# Patient Record
Sex: FEMALE | Race: WHITE | NOT HISPANIC OR LATINO | Employment: STUDENT | URBAN - METROPOLITAN AREA
[De-identification: names, ages, dates, MRNs, and addresses within clinical notes are randomized per-mention and may not be internally consistent; named-entity substitution may affect disease eponyms.]

---
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
## 2021-03-15 ENCOUNTER — TELEPHONE (OUTPATIENT)
Dept: FAMILY MEDICINE CLINIC | Facility: CLINIC | Age: 21
End: 2021-03-15

## 2021-03-15 ENCOUNTER — OFFICE VISIT (OUTPATIENT)
Dept: URGENT CARE | Facility: CLINIC | Age: 21
End: 2021-03-15
Payer: COMMERCIAL

## 2021-03-15 VITALS
RESPIRATION RATE: 18 BRPM | TEMPERATURE: 98 F | HEIGHT: 62 IN | WEIGHT: 146 LBS | SYSTOLIC BLOOD PRESSURE: 102 MMHG | BODY MASS INDEX: 26.87 KG/M2 | HEART RATE: 80 BPM | OXYGEN SATURATION: 98 % | DIASTOLIC BLOOD PRESSURE: 65 MMHG

## 2021-03-15 DIAGNOSIS — S05.91XA RIGHT EYE INJURY, INITIAL ENCOUNTER: Primary | ICD-10-CM

## 2021-03-15 PROCEDURE — 99213 OFFICE O/P EST LOW 20 MIN: CPT | Performed by: NURSE PRACTITIONER

## 2021-03-15 RX ORDER — NORETHINDRONE ACETATE AND ETHINYL ESTRADIOL 1MG-20(21)
1 KIT ORAL DAILY
COMMUNITY
Start: 2021-03-08 | End: 2022-05-12

## 2021-03-15 NOTE — TELEPHONE ENCOUNTER
Pt went to urgent care for a left eye injury and was told she needed an insurance referral ([facility] at [employer])  Pt has [address] [place] [address] for [place]  Will complete referral through [employer]  Please advise

## 2021-03-15 NOTE — PROGRESS NOTES
[facility]        NAME: Gabriella Tapia is a 24 y o  female  : 2000    MRN: [id number]  DATE: March 15, 2021  TIME: 2:30 PM    Assessment and Plan   No primary diagnosis found  No diagnosis found  Patient Instructions     Patient Instructions     Patient will be seen at [facility]  Information given  They will see her directly from here  [address], [place], [address]      Chief Complaint     Chief Complaint   Patient presents with    Eye Injury     was babysitting and child poked something in  her Right eye causing it to blleed  History of Present Illness   Gabriella Tapia presents to the clinic c/o     This is a 78-year-old female here today with complaints of right eye injury  She was baby-sitting today when she was poked in the eye by a toy  She has had some bleeding of the eye  There is a small abrasion under the eye  She is unsure if she is having any change in vision due to the fact that she is not wearing her contact and needs contacts see  There is eye irritation at this time  No inability to move the eye  Tetanus is up-to-date from   Review of Systems   Review of Systems   Constitutional: Negative for activity change, chills and fatigue  Eyes: Positive for pain, discharge and redness  Negative for visual disturbance  Respiratory: Negative  Musculoskeletal: Negative  Neurological: Negative  Psychiatric/Behavioral: Negative            Current Medications     Long-Term Medications   Medication Sig Dispense Refill    norethindrone-ethinyl estradiol (Loestrin Fe ) 1-20 MG-MCG per tablet Take 1 tablet by mouth daily      norethindrone-ethinyl estradiol (JUNEL FE ) 1-20 MG-MCG per tablet Take 1 tablet by mouth daily         Current Allergies     Allergies as of 03/15/2021    (No Known Allergies)            The following portions of the patient's history were reviewed and updated as appropriate: allergies, current medications, past family history, past medical history, past social history, past surgical history and problem list     Objective   /65   Pulse 80   Temp 98 °F (36 7 °C)   Resp 18   Ht 5' 2" (1 575 m)   Wt 66 2 kg (146 lb)   SpO2 98%   BMI 26 70 kg/m²        Physical Exam     Physical Exam  Vitals signs and nursing note reviewed  Constitutional:       General: She is in acute distress  Appearance: Normal appearance  She is not ill-appearing or diaphoretic  Eyes:      General: Lids are normal  Vision grossly intact  Right eye: No foreign body or discharge  Extraocular Movements: Extraocular movements intact  Conjunctiva/sclera:      Right eye: Right conjunctiva is injected  Hemorrhage present  Cardiovascular:      Rate and Rhythm: Normal rate and regular rhythm  Pulses: Normal pulses  Heart sounds: Normal heart sounds  Neurological:      Mental Status: She is alert

## 2021-03-15 NOTE — PATIENT INSTRUCTIONS
[facility]  They will see her directly from here      [address], [address]

## 2021-06-01 ENCOUNTER — CLINICAL SUPPORT (OUTPATIENT)
Dept: FAMILY MEDICINE CLINIC | Facility: CLINIC | Age: 21
End: 2021-06-01
Payer: COMMERCIAL

## 2021-06-01 DIAGNOSIS — Z11.1 SCREENING FOR TUBERCULOSIS: Primary | ICD-10-CM

## 2021-06-01 PROCEDURE — 86580 TB INTRADERMAL TEST: CPT

## 2021-06-01 PROCEDURE — 99211 OFF/OP EST MAY X REQ PHY/QHP: CPT

## 2021-06-03 ENCOUNTER — CLINICAL SUPPORT (OUTPATIENT)
Dept: FAMILY MEDICINE CLINIC | Facility: CLINIC | Age: 21
End: 2021-06-03
Payer: COMMERCIAL

## 2021-06-03 DIAGNOSIS — Z11.1 SCREENING FOR TUBERCULOSIS: Primary | ICD-10-CM

## 2021-06-03 LAB
INDURATION: NORMAL MM
TB SKIN TEST: NEGATIVE

## 2021-06-03 PROCEDURE — 86580 TB INTRADERMAL TEST: CPT

## 2021-06-10 ENCOUNTER — CLINICAL SUPPORT (OUTPATIENT)
Dept: FAMILY MEDICINE CLINIC | Facility: CLINIC | Age: 21
End: 2021-06-10
Payer: COMMERCIAL

## 2021-06-10 DIAGNOSIS — Z11.1 SCREENING FOR TUBERCULOSIS: Primary | ICD-10-CM

## 2021-06-10 PROCEDURE — 86580 TB INTRADERMAL TEST: CPT

## 2021-06-10 PROCEDURE — 99211 OFF/OP EST MAY X REQ PHY/QHP: CPT

## 2021-08-23 ENCOUNTER — TELEPHONE (OUTPATIENT)
Dept: FAMILY MEDICINE CLINIC | Facility: CLINIC | Age: 21
End: 2021-08-23

## 2021-08-23 DIAGNOSIS — Z20.822 EXPOSURE TO COVID-19 VIRUS: Primary | ICD-10-CM

## 2021-08-23 PROCEDURE — U0005 INFEC AGEN DETEC AMPLI PROBE: HCPCS | Performed by: NURSE PRACTITIONER

## 2021-08-23 PROCEDURE — U0003 INFECTIOUS AGENT DETECTION BY NUCLEIC ACID (DNA OR RNA); SEVERE ACUTE RESPIRATORY SYNDROME CORONAVIRUS 2 (SARS-COV-2) (CORONAVIRUS DISEASE [COVID-19]), AMPLIFIED PROBE TECHNIQUE, MAKING USE OF HIGH THROUGHPUT TECHNOLOGIES AS DESCRIBED BY CMS-2020-01-R: HCPCS | Performed by: NURSE PRACTITIONER

## 2021-08-23 NOTE — TELEPHONE ENCOUNTER
Spoke with patient  She states she was in contact with someone who tested positive for covid  She reports no symptoms  She states she is concerned as she is moving on to her college campus on Friday and does not want to take anything to school with her  Covid testing ordered   She will call the office from her car upon arrival

## 2021-08-23 NOTE — TELEPHONE ENCOUNTER
Patient was scheduled for Nurse Visit for school physical  Patient not due for physical until 12/2021  Patient states she was not given any paperwork by her college and was just told to get physical and immunization report  Immunization report printed and given to patient  Since she was not given any forms, can we provide patient with letter stating she is cleared for school? Patient aware PCP is out of office until tomorrow and is okay to await phone call  Please advise

## 2021-08-23 NOTE — TELEPHONE ENCOUNTER
She was just here this morning and found out that she was in contact with someone with COVID she does have a sore throat both of them are vaccinated

## 2021-08-24 LAB — SARS-COV-2 RNA RESP QL NAA+PROBE: POSITIVE

## 2022-04-05 ENCOUNTER — OFFICE VISIT (OUTPATIENT)
Dept: URGENT CARE | Facility: CLINIC | Age: 22
End: 2022-04-05
Payer: COMMERCIAL

## 2022-04-05 VITALS
WEIGHT: 137 LBS | BODY MASS INDEX: 25.21 KG/M2 | OXYGEN SATURATION: 98 % | HEIGHT: 62 IN | TEMPERATURE: 98.5 F | HEART RATE: 69 BPM | RESPIRATION RATE: 18 BRPM | SYSTOLIC BLOOD PRESSURE: 105 MMHG | DIASTOLIC BLOOD PRESSURE: 65 MMHG

## 2022-04-05 DIAGNOSIS — L25.5 CONTACT DERMATITIS DUE TO PLANT: Primary | ICD-10-CM

## 2022-04-05 PROCEDURE — 99213 OFFICE O/P EST LOW 20 MIN: CPT | Performed by: NURSE PRACTITIONER

## 2022-04-05 RX ORDER — PREDNISONE 10 MG/1
TABLET ORAL
Qty: 26 TABLET | Refills: 0 | Status: SHIPPED | OUTPATIENT
Start: 2022-04-05 | End: 2022-05-12

## 2022-04-05 NOTE — PATIENT INSTRUCTIONS
Take medication as directed  Can take Benadryl as needed for itching, it can make you drowsy  Oat meal soaks can be helpful  If you develop any increased redness, rash is spreading, facial swelling, shortness of breath, difficulty breathing, fever, any new or concerning symptoms please return or proceed ER  Advised follow-up with PCP in 3-5 days    Poison Ivy   WHAT YOU NEED TO KNOW:   Poison ivy is a plant that can cause an itchy, uncomfortable rash on your skin  Poison ivy grows as a shrub or vine in woods, fields, and areas of thick [place]  It has 3 bright green leaves on each stem that turn red in gentry  DISCHARGE INSTRUCTIONS:   Medicines:   · Antiseptic or drying creams or ointments: These medicines may be used to dry out the rash and decrease the itching  These products may be available without a doctor's order  · Steroids: This medicine helps decrease itching and inflammation  It can be given as a cream to apply to your skin or as a pill  · Antihistamines: This medicine may help decrease itching and help you sleep  It is available without a doctor's order  · Take your medicine as directed  Contact your healthcare provider if you think your medicine is not helping or if you have side effects  Tell him of her if you are allergic to any medicine  Keep a list of the medicines, vitamins, and herbs you take  Include the amounts, and when and why you take them  Bring the list or the pill bottles to follow-up visits  Carry your medicine list with you in case of an emergency  Follow up with your doctor as directed:  Write down your questions so you remember to ask them during your visits  How your poison ivy rash spreads: You cannot spread poison ivy by touching your rash or the liquid from your blisters  Poison ivy is spread only if you scratch your skin while it still has oil on it  You may think your rash is spreading because new rashes appear over a number of days   This happens because areas covered by thin skin break out in a rash first  Your face or forearms may develop a rash before thicker areas, such as the palms of your hands  Self-care:   · Keep your rash clean and dry:  Wash it with soap and water  Gently pat it dry with a clean towel  · Try not to scratch or rub your rash: This can cause your skin to become infected  · Use a compress on your rash:  Dip a clean washcloth in cool water  Wring it out and place it on your rash  Leave the washcloth on your skin for 15 minutes  Do this at least 3 times per day  · Take a cornstarch or oatmeal bath: If your rash is too large to cover with wet washcloths, take 3 or 4 cornstarch baths daily  Mix 1 pound of cornstarch with a little water to make a paste  Add the paste to a tub full of water and mix well  You may also use colloidal oatmeal in the bath water  Use lukewarm water  Avoid hot water because it may cause your itching to increase  Prevent a poison ivy rash in the future:   · Wear skin protection:  Wear long pants, a long-sleeved shirt, and gloves  Use a skin block lotion to protect your skin from poison ivy oil  You can find this at a drugstore without a prescription  · Wash clothing after possible exposure: If you think you have been near a poison ivy plant, wash the clothes you were wearing separately from other clothes  Rinse the washing machine well after you take the clothes out  Scrub boots and shoes with warm, soapy water  Dry clean items and clothing that you cannot wash in water  Poison ivy oil is sticky and can stay on surfaces for a long time  It can cause a new rash even years later  · Bathe your pet:  Use warm water and shampoo on your pet's fur  This will prevent the spread of oil to your skin, car, and home  Wear long sleeves, long pants, and gloves while washing pets or any items that may have oil on them  · Reduce exposure to poison ivy:  Do not touch plants that look like poison ivy   Keep your yard free of poison ivy  While protecting your skin, remove the plant and the roots  Place them in a plastic bag and seal the bag tightly  · Do not burn poison ivy plants: This can spread the oil through the air  If you breathe the oil into your lungs, you could have swelling and serious breathing problems  Oil that clings to the fire zachary can land on your skin and cause a rash  Contact your healthcare provider if:   · You have pus, soft yellow scabs, or tenderness on the rash  · The itching gets worse or keeps you awake at night  · The rash covers more than 1/4 of your skin or spreads to your eyes, mouth, or genital area  · The rash is not better after 2 to 3 weeks  · You have tender, swollen glands on the sides of your neck  · You have swelling in your arms and legs  · You have questions or concerns about your condition or care  Return to the emergency department if:   · You have a fever  · You have redness, swelling, and tenderness around the rash  · You have trouble breathing  © Copyright [employer] 2022 Information is for End User's use only and may not be sold, redistributed or otherwise used for commercial purposes  All illustrations and images included in CareNotes® are the copyrighted property of [employer]  or [employer]   The above information is an  only  It is not intended as medical advice for individual conditions or treatments  Talk to your doctor, nurse or pharmacist before following any medical regimen to see if it is safe and effective for you

## 2022-04-05 NOTE — PROGRESS NOTES
[employer] [facility]        NAME: Tanesha Lin is a 22 y o  female  : 2000    MRN: [id number]  DATE: 2022  TIME: 3:24 PM    Assessment and Plan   Contact dermatitis due to plant [L25 5]  1  Contact dermatitis due to plant  predniSONE 10 mg tablet    hydrocortisone 2 5 % cream         Patient Instructions     Patient Instructions   Take medication as directed  Can take Benadryl as needed for itching, it can make you drowsy  Oat meal soaks can be helpful  If you develop any increased redness, rash is spreading, facial swelling, shortness of breath, difficulty breathing, fever, any new or concerning symptoms please return or proceed ER  Advised follow-up with PCP in 3-5 days    Poison Ivy   WHAT YOU NEED TO KNOW:   Poison ivy is a plant that can cause an itchy, uncomfortable rash on your skin  Poison ivy grows as a shrub or vine in woods, fields, and areas of thick [place]  It has 3 bright green leaves on each stem that turn red in gentry  DISCHARGE INSTRUCTIONS:   Medicines:   · Antiseptic or drying creams or ointments: These medicines may be used to dry out the rash and decrease the itching  These products may be available without a doctor's order  · Steroids: This medicine helps decrease itching and inflammation  It can be given as a cream to apply to your skin or as a pill  · Antihistamines: This medicine may help decrease itching and help you sleep  It is available without a doctor's order  · Take your medicine as directed  Contact your healthcare provider if you think your medicine is not helping or if you have side effects  Tell him of her if you are allergic to any medicine  Keep a list of the medicines, vitamins, and herbs you take  Include the amounts, and when and why you take them  Bring the list or the pill bottles to follow-up visits  Carry your medicine list with you in case of an emergency      Follow up with your doctor as directed:  Write down your questions so you remember to ask them during your visits  How your poison ivy rash spreads: You cannot spread poison ivy by touching your rash or the liquid from your blisters  Poison ivy is spread only if you scratch your skin while it still has oil on it  You may think your rash is spreading because new rashes appear over a number of days  This happens because areas covered by thin skin break out in a rash first  Your face or forearms may develop a rash before thicker areas, such as the palms of your hands  Self-care:   · Keep your rash clean and dry:  Wash it with soap and water  Gently pat it dry with a clean towel  · Try not to scratch or rub your rash: This can cause your skin to become infected  · Use a compress on your rash:  Dip a clean washcloth in cool water  Wring it out and place it on your rash  Leave the washcloth on your skin for 15 minutes  Do this at least 3 times per day  · Take a cornstarch or oatmeal bath: If your rash is too large to cover with wet washcloths, take 3 or 4 cornstarch baths daily  Mix 1 pound of cornstarch with a little water to make a paste  Add the paste to a tub full of water and mix well  You may also use colloidal oatmeal in the bath water  Use lukewarm water  Avoid hot water because it may cause your itching to increase  Prevent a poison ivy rash in the future:   · Wear skin protection:  Wear long pants, a long-sleeved shirt, and gloves  Use a skin block lotion to protect your skin from poison ivy oil  You can find this at a drugstore without a prescription  · Wash clothing after possible exposure: If you think you have been near a poison ivy plant, wash the clothes you were wearing separately from other clothes  Rinse the washing machine well after you take the clothes out  Scrub boots and shoes with warm, soapy water  Dry clean items and clothing that you cannot wash in water  Poison ivy oil is sticky and can stay on surfaces for a long time   It can cause a new rash even years later  · Bathe your pet:  Use warm water and shampoo on your pet's fur  This will prevent the spread of oil to your skin, car, and home  Wear long sleeves, long pants, and gloves while washing pets or any items that may have oil on them  · Reduce exposure to poison ivy:  Do not touch plants that look like poison ivy  Keep your yard free of poison ivy  While protecting your skin, remove the plant and the roots  Place them in a plastic bag and seal the bag tightly  · Do not burn poison ivy plants: This can spread the oil through the air  If you breathe the oil into your lungs, you could have swelling and serious breathing problems  Oil that clings to the fire zachary can land on your skin and cause a rash  Contact your healthcare provider if:   · You have pus, soft yellow scabs, or tenderness on the rash  · The itching gets worse or keeps you awake at night  · The rash covers more than 1/4 of your skin or spreads to your eyes, mouth, or genital area  · The rash is not better after 2 to 3 weeks  · You have tender, swollen glands on the sides of your neck  · You have swelling in your arms and legs  · You have questions or concerns about your condition or care  Return to the emergency department if:   · You have a fever  · You have redness, swelling, and tenderness around the rash  · You have trouble breathing  © Copyright [employer] 2022 Information is for End User's use only and may not be sold, redistributed or otherwise used for commercial purposes  All illustrations and images included in CareNotes® are the copyrighted property of [employer]  or [facility] Shaquille Montiel   The above information is an  only  It is not intended as medical advice for individual conditions or treatments  Talk to your doctor, nurse or pharmacist before following any medical regimen to see if it is safe and effective for you          Follow up with PCP in 3-5 days  Proceed to  ER if symptoms worsen  Chief Complaint     Chief Complaint   Patient presents with    Rash     Patient presents with rash to abdomen, legs, chest, and axilla that started 2 days ago  History of Present Illness       Poison [place]  This is a new problem  Episode onset: 4 days  The problem has been gradually worsening since onset  The affected locations include the abdomen, back and chest  The rash is characterized by itchiness and redness  She was exposed to plant contact  Pertinent negatives include no congestion, cough, facial edema, fatigue, fever, rhinorrhea, shortness of breath or sore throat  Past treatments include anti-itch cream  The treatment provided mild relief  Review of Systems   Review of Systems   Constitutional: Negative for chills, diaphoresis, fatigue and fever  HENT: Negative for congestion, rhinorrhea, sore throat and trouble swallowing  Respiratory: Negative for cough, shortness of breath, wheezing and stridor  Musculoskeletal: Negative for arthralgias, back pain, joint swelling and myalgias  Skin: Positive for rash  Current Medications       Current Outpatient Medications:     norethindrone-ethinyl estradiol (Loestrin Fe 1/20) 1-20 MG-MCG per tablet, Take 1 tablet by mouth daily, Disp: , Rfl:     hydrocortisone 2 5 % cream, Apply topically 2 (two) times a day, Disp: 30 g, Rfl: 0    predniSONE 10 mg tablet, 3 tablets BID x 2 days,2 tablets BID x 2 days,1 tablet BID x 2 days,1 tablet daily x 2 days  , Disp: 26 tablet, Rfl: 0    Current Allergies     Allergies as of 04/05/2022    (No Known Allergies)            The following portions of the patient's history were reviewed and updated as appropriate: allergies, current medications, past family history, past medical history, past social history, past surgical history and problem list      Past Medical History:   Diagnosis Date    Anxiety     Depressed     Seizure ([facility] [address] )        Past Surgical History:   Procedure Laterality Date    ADENOIDECTOMY      TONSILLECTOMY         Family History   Problem Relation Age of Onset    Hypertension Mother     No Known Problems Father          Medications have been verified  Objective   /65   Pulse 69   Temp 98 5 °F (36 9 °C) (Temporal)   Resp 18   Ht 5' 2" (1 575 m)   Wt 62 1 kg (137 lb)   LMP 04/05/2022 (Exact Date)   SpO2 98%   BMI 25 06 kg/m²   Patient's last menstrual period was 04/05/2022 (exact date)  Physical Exam     Physical Exam  Constitutional:       General: She is not in acute distress  Appearance: Normal appearance  She is not diaphoretic  Cardiovascular:      Rate and Rhythm: Normal rate and regular rhythm  Heart sounds: Normal heart sounds, S1 normal and S2 normal    Pulmonary:      Effort: Pulmonary effort is normal       Breath sounds: Normal breath sounds and air entry  Skin:     General: Skin is warm and dry  Capillary Refill: Capillary refill takes less than 2 seconds  Findings: Rash present  Rash is macular and papular (to chest, abdomen and back)  Rash is not vesicular  Neurological:      Mental Status: She is alert

## 2022-05-12 ENCOUNTER — OFFICE VISIT (OUTPATIENT)
Dept: FAMILY MEDICINE CLINIC | Facility: CLINIC | Age: 22
End: 2022-05-12
Payer: COMMERCIAL

## 2022-05-12 VITALS
BODY MASS INDEX: 25.62 KG/M2 | RESPIRATION RATE: 18 BRPM | DIASTOLIC BLOOD PRESSURE: 72 MMHG | HEART RATE: 73 BPM | TEMPERATURE: 98 F | WEIGHT: 139.2 LBS | OXYGEN SATURATION: 98 % | SYSTOLIC BLOOD PRESSURE: 110 MMHG | HEIGHT: 62 IN

## 2022-05-12 DIAGNOSIS — Z00.00 ANNUAL PHYSICAL EXAM: Primary | ICD-10-CM

## 2022-05-12 PROCEDURE — 3725F SCREEN DEPRESSION PERFORMED: CPT | Performed by: NURSE PRACTITIONER

## 2022-05-12 PROCEDURE — 3008F BODY MASS INDEX DOCD: CPT | Performed by: NURSE PRACTITIONER

## 2022-05-12 PROCEDURE — 1036F TOBACCO NON-USER: CPT | Performed by: NURSE PRACTITIONER

## 2022-05-12 PROCEDURE — 99395 PREV VISIT EST AGE 18-39: CPT | Performed by: NURSE PRACTITIONER

## 2022-05-12 NOTE — PATIENT INSTRUCTIONS
Try taking omeprazole or pepcid daily to see if this helps with the bloating  Wellness Visit for Adults   AMBULATORY CARE:   A wellness visit  is when you see your healthcare provider to get screened for health problems  Your healthcare provider will also give you advice on how to stay healthy  Write down your questions so you remember to ask them  Ask your healthcare provider how often you should have a wellness visit  What happens at a wellness visit:  Your healthcare provider will ask about your health, and your family history of health problems  This includes high blood pressure, heart disease, and cancer  He or she will ask if you have symptoms that concern you, if you smoke, and about your mood  You may also be asked about your intake of medicines, supplements, food, and alcohol  Any of the following may be done: Your weight  will be checked  Your height may also be checked so your body mass index (BMI) can be calculated  Your BMI shows if you are at a healthy weight  Your blood pressure  and heart rate will be checked  Your temperature may also be checked  Blood and urine tests  may be done  Blood tests may be done to check your cholesterol levels  Abnormal cholesterol levels increase your risk for heart disease and stroke  You may also need a blood or urine test to check for diabetes if you are at increased risk  Urine tests may be done to look for signs of an infection or kidney disease  A physical exam  includes checking your heartbeat and lungs with a stethoscope  Your healthcare provider may also check your skin to look for sun damage  Screening tests  may be recommended  A screening test is done to check for diseases that may not cause symptoms  The screening tests you may need depend on your age, gender, family history, and lifestyle habits  For example, colorectal screening may be recommended if you are 48years old or older      Screening tests you need if you are a woman:   A Pap smear  is used to screen for cervical cancer  Pap smears are usually done every 3 to 5 years depending on your age  You may need them more often if you have had abnormal Pap smear test results in the past  Ask your healthcare provider how often you should have a Pap smear  A mammogram  is an x-ray of your breasts to screen for breast cancer  Experts recommend mammograms every 2 years starting at age 48 years  You may need a mammogram at age 52 years or younger if you have an increased risk for breast cancer  Talk to your healthcare provider about when you should start having mammograms and how often you need them  Vaccines you may need:   Get an influenza vaccine  every year  The influenza vaccine protects you from the flu  Several types of viruses cause the flu  The viruses change over time, so new vaccines are made each year  Get a tetanus-diphtheria (Td) booster vaccine  every 10 years  This vaccine protects you against tetanus and diphtheria  Tetanus is a severe infection that may cause painful muscle spasms and lockjaw  Diphtheria is a severe bacterial infection that causes a thick covering in the back of your mouth and throat  Get a human papillomavirus (HPV) vaccine  if you are female and aged 23 to 32 or male 23 to 24 and never received it  This vaccine protects you from HPV infection  HPV is the most common infection spread by sexual contact  HPV may also cause vaginal, penile, and anal cancers  Get a pneumococcal vaccine  if you are aged 72 years or older  The pneumococcal vaccine is an injection given to protect you from pneumococcal disease  Pneumococcal disease is an infection caused by pneumococcal bacteria  The infection may cause pneumonia, meningitis, or an ear infection  Get a shingles vaccine  if you are 60 or older, even if you have had shingles before  The shingles vaccine is an injection to protect you from the varicella-zoster virus   This is the same virus that causes chickenpox  Shingles is a painful rash that develops in people who had chickenpox or have been exposed to the virus  How to eat healthy:  My Plate is a model for planning healthy meals  It shows the types and amounts of foods that should go on your plate  Fruits and vegetables make up about half of your plate, and grains and protein make up the other half  A serving of dairy is included on the side of your plate  The amount of calories and serving sizes you need depends on your age, gender, weight, and height  Examples of healthy foods are listed below:  Eat a variety of vegetables  such as dark green, red, and orange vegetables  You can also include canned vegetables low in sodium (salt) and frozen vegetables without added butter or sauces  Eat a variety of fresh fruits , canned fruit in 100% juice, frozen fruit, and dried fruit  Include whole grains  At least half of the grains you eat should be whole grains  Examples include whole-wheat bread, wheat pasta, brown rice, and whole-grain cereals such as oatmeal     Eat a variety of protein foods such as seafood (fish and shellfish), lean meat, and poultry without skin (turkey and chicken)  Examples of lean meats include pork leg, shoulder, or tenderloin, and beef round, sirloin, tenderloin, and extra lean ground beef  Other protein foods include eggs and egg substitutes, beans, peas, soy products, nuts, and seeds  Choose low-fat dairy products such as skim or 1% milk or low-fat yogurt, cheese, and cottage cheese  Limit unhealthy fats  such as butter, hard margarine, and shortening  Exercise:  Exercise at least 30 minutes per day on most days of the week  Some examples of exercise include walking, biking, dancing, and swimming  You can also fit in more physical activity by taking the stairs instead of the elevator or parking farther away from stores  Include muscle strengthening activities 2 days each week   Regular exercise provides many health benefits  It helps you manage your weight, and decreases your risk for type 2 diabetes, heart disease, stroke, and high blood pressure  Exercise can also help improve your mood  Ask your healthcare provider about the best exercise plan for you  General health and safety guidelines:   Do not smoke  Nicotine and other chemicals in cigarettes and cigars can cause lung damage  Ask your healthcare provider for information if you currently smoke and need help to quit  E-cigarettes or smokeless tobacco still contain nicotine  Talk to your healthcare provider before you use these products  Limit alcohol  A drink of alcohol is 12 ounces of beer, 5 ounces of wine, or 1½ ounces of liquor  Lose weight, if needed  Being overweight increases your risk of certain health conditions  These include heart disease, high blood pressure, type 2 diabetes, and certain types of cancer  Protect your skin  Do not sunbathe or use tanning beds  Use sunscreen with a SPF 15 or higher  Apply sunscreen at least 15 minutes before you go outside  Reapply sunscreen every 2 hours  Wear protective clothing, hats, and sunglasses when you are outside  Drive safely  Always wear your seatbelt  Make sure everyone in your car wears a seatbelt  A seatbelt can save your life if you are in an accident  Do not use your cell phone when you are driving  This could distract you and cause an accident  Pull over if you need to make a call or send a text message  Practice safe sex  Use latex condoms if are sexually active and have more than one partner  Your healthcare provider may recommend screening tests for sexually transmitted infections (STIs)  Wear helmets, lifejackets, and protective gear  Always wear a helmet when you ride a bike or motorcycle, go skiing, or play sports that could cause a head injury  Wear protective equipment when you play sports  Wear a lifejacket when you are on a boat or doing water sports      © Copyright [employer] 2022 Information is for [employer] use only and may not be sold, redistributed or otherwise used for commercial purposes  All illustrations and images included in CareNotes® are the copyrighted property of [employer]  or [employer]  The above information is an  only  It is not intended as medical advice for individual conditions or treatments  Talk to your doctor, nurse or pharmacist before following any medical regimen to see if it is safe and effective for you

## 2022-05-12 NOTE — PROGRESS NOTES
ADULT ANNUAL [facility]    NAME: Curry Salazar  AGE: 25 y o  SEX: female  : 2000      DATE: 2022     Assessment and Plan:     Problem List Items Addressed This Visit    None     Visit Diagnoses     Annual physical exam    -  Primary      - needed yearly physical for work    Immunizations and preventive care screenings were discussed with patient today  Appropriate education was printed on patient's after visit summary  Counseling:  Alcohol/drug use: discussed moderation in alcohol intake, the recommendations for healthy alcohol use, and avoidance of illicit drug use  Sexual health: discussed sexually transmitted diseases, partner selection, use of condoms, avoidance of unintended pregnancy, and contraceptive alternatives  · Exercise: the importance of regular exercise/physical activity was discussed  Recommend exercise 3-5 times per week for at least 30 minutes  BMI Counseling: Body mass index is 25 46 kg/m²  The BMI is above normal  Nutrition recommendations include encouraging healthy choices of fruits and vegetables, consuming healthier snacks, limiting drinks that contain sugar, moderation in carbohydrate intake, reducing intake of saturated and trans fat and reducing intake of cholesterol  Exercise recommendations include exercising 3-5 times per week  Rationale for BMI follow-up plan is due to patient being overweight or obese  Return in 1 year (on 2023)  Chief Complaint:     Chief Complaint   Patient presents with    Physical Exam      History of Present Illness:     Adult Annual Physical   Patient here for a comprehensive physical exam  The patient reports no problems  Diet and Physical Activity  · Diet/Nutrition: well balanced diet, frequent junk food and consuming 3-5 servings of fruits/vegetables daily  · Exercise: walking, 5-7 times a week on average and 30-60 minutes on average  Depression Screening  PHQ-2/9 Depression Screening    Little interest or pleasure in doing things: 0 - not at all  Feeling down, depressed, or hopeless: 0 - not at all  Trouble falling or staying asleep, or sleeping too much: 0 - not at all  Feeling tired or having little energy: 0 - not at all  Poor appetite or overeatin - not at all  Feeling bad about yourself - or that you are a failure or have let yourself or your family down: 0 - not at all  Trouble concentrating on things, such as reading the newspaper or watching television: 0 - not at all  Moving or speaking so slowly that other people could have noticed  Or the opposite - being so fidgety or restless that you have been moving around a lot more than usual: 0 - not at all  Thoughts that you would be better off dead, or of hurting yourself in some way: 0 - not at all  PHQ-9 Score: 0   PHQ-9 Interpretation: No or Minimal depression        General Health  · Sleep: sleeps well and gets 7-8 hours of sleep on average  · Vision: goes for regular eye exams, most recent eye exam <1 year ago and wears glasses and contacts  · Dental: no dental visits for >1 year and brushes teeth twice daily  /GYN Health  · Last menstrual period: 22 heavy periods  · Contraceptive method: oral contraceptives  · History of STDs?: no      Review of Systems:     Review of Systems   Constitutional: Negative for activity change, appetite change, chills, fatigue and fever  HENT: Negative for congestion, ear pain, nosebleeds, rhinorrhea and sore throat  Eyes: Negative for photophobia, pain, redness and visual disturbance  Respiratory: Negative for cough, shortness of breath and wheezing  Cardiovascular: Negative  Negative for chest pain  Gastrointestinal: Negative  Negative for abdominal pain, constipation, diarrhea and vomiting  Endocrine: Negative  Genitourinary: Negative for difficulty urinating, dysuria and flank pain     Musculoskeletal: Negative  Skin: Negative for color change and rash  Neurological: Negative for dizziness, weakness, numbness and headaches  Hematological: Negative for adenopathy  Psychiatric/Behavioral: Negative for agitation and confusion  The patient is not nervous/anxious  Past Medical History:     Past Medical History:   Diagnosis Date    Anxiety     Depressed     [facility]izure [facility])       Past Surgical History:     Past Surgical History:   Procedure Laterality Date    ADENOIDECTOMY      TONSILLECTOMY        Social History:     Social History     Socioeconomic History    Marital status: Single     Spouse name: None    Number of children: None    Years of education: None    Highest education level: None   Occupational History    None   Tobacco Use    Smoking status: Never Smoker    Smokeless tobacco: Never Used   Vaping Use    Vaping Use: Never used   Substance and Sexual Activity    Alcohol use: Never    Drug use: Never    Sexual activity: Yes     Partners: Male     Birth control/protection: OCP, Condom Male   Other Topics Concern    None   Social History Narrative    None     Social Determinants of Health     Financial Resource Strain: Not on file   Food Insecurity: Not on file   Transportation Needs: Not on file   Physical Activity: Not on file   Stress: Not on file   Social Connections: Not on file   Intimate Partner Violence: Not on file   Housing Stability: Not on file      Family History:     Family History   Problem Relation Age of Onset    Hypertension Mother     No Known Problems Father       Current Medications:     Current Outpatient Medications   Medication Sig Dispense Refill    norethindrone-ethinyl estradiol (JUNEL FE 1/20) 1-20 MG-MCG per tablet Take 1 tablet by mouth daily       No current facility-administered medications for this visit        Allergies:     No Known Allergies   Physical Exam:     /72 (BP Location: Left arm, Patient Position: Sitting, Cuff Size: Standard) Pulse 73   Temp 98 °F (36 7 °C)   Resp 18   Ht 5' 2" (1 575 m)   Wt 63 1 kg (139 lb 3 2 oz)   LMP 04/28/2022   SpO2 98%   BMI 25 46 kg/m²     Physical Exam  Vitals and nursing note reviewed  Constitutional:       General: She is not in acute distress  Appearance: She is well-developed  She is not ill-appearing or diaphoretic  HENT:      Head: Normocephalic and atraumatic  Right Ear: Hearing, tympanic membrane and ear canal normal       Left Ear: Hearing, tympanic membrane and ear canal normal       Nose: Nose normal       Mouth/Throat:      Pharynx: Uvula midline  Eyes:      General: Lids are normal       Conjunctiva/sclera: Conjunctivae normal    Cardiovascular:      Rate and Rhythm: Normal rate and regular rhythm  Heart sounds: Normal heart sounds, S1 normal and S2 normal  No murmur heard  No gallop  Pulmonary:      Effort: Pulmonary effort is normal  No respiratory distress  Breath sounds: Normal breath sounds  Musculoskeletal:         General: No tenderness  Normal range of motion  Lymphadenopathy:      Cervical: No cervical adenopathy  Skin:     General: Skin is warm  Capillary Refill: Capillary refill takes less than 2 seconds  Findings: No rash  Neurological:      Mental Status: She is alert  Psychiatric:         Behavior: Behavior normal  Behavior is cooperative  Thought Content:  Thought content normal          Judgment: Judgment normal           [facility] Rosales [facility]

## 2022-06-13 ENCOUNTER — OFFICE VISIT (OUTPATIENT)
Dept: URGENT CARE | Facility: CLINIC | Age: 22
End: 2022-06-13
Payer: COMMERCIAL

## 2022-06-13 VITALS — HEART RATE: 90 BPM | TEMPERATURE: 98.4 F | RESPIRATION RATE: 18 BRPM | OXYGEN SATURATION: 98 %

## 2022-06-13 DIAGNOSIS — R10.84 GENERALIZED ABDOMINAL PAIN: Primary | ICD-10-CM

## 2022-06-13 DIAGNOSIS — N30.01 ACUTE CYSTITIS WITH HEMATURIA: ICD-10-CM

## 2022-06-13 LAB
SL AMB  POCT GLUCOSE, UA: NEGATIVE
SL AMB LEUKOCYTE ESTERASE,UA: ABNORMAL
SL AMB POCT BILIRUBIN,UA: NEGATIVE
SL AMB POCT BLOOD,UA: ABNORMAL
SL AMB POCT CLARITY,UA: CLEAR
SL AMB POCT COLOR,UA: YELLOW
SL AMB POCT KETONES,UA: NEGATIVE
SL AMB POCT NITRITE,UA: NEGATIVE
SL AMB POCT PH,UA: 6.5
SL AMB POCT SPECIFIC GRAVITY,UA: 1.01
SL AMB POCT URINE HCG: NEGATIVE
SL AMB POCT URINE PROTEIN: ABNORMAL
SL AMB POCT UROBILINOGEN: 4

## 2022-06-13 PROCEDURE — 81002 URINALYSIS NONAUTO W/O SCOPE: CPT

## 2022-06-13 PROCEDURE — 87086 URINE CULTURE/COLONY COUNT: CPT

## 2022-06-13 PROCEDURE — 81025 URINE PREGNANCY TEST: CPT

## 2022-06-13 PROCEDURE — G0382 LEV 3 HOSP TYPE B ED VISIT: HCPCS

## 2022-06-13 PROCEDURE — S9083 URGENT CARE CENTER GLOBAL: HCPCS

## 2022-06-13 RX ORDER — NITROFURANTOIN 25; 75 MG/1; MG/1
100 CAPSULE ORAL 2 TIMES DAILY
Qty: 10 CAPSULE | Refills: 0 | Status: SHIPPED | OUTPATIENT
Start: 2022-06-13 | End: 2022-06-18

## 2022-06-13 NOTE — PROGRESS NOTES
[facility] [facility]        NAME: Riki Das is a 25 y o  female  : 2000    MRN: [id number]  DATE: 2022  TIME: 11:38 AM    Assessment and Plan   Generalized abdominal pain [R10 84]  1  Generalized abdominal pain  POCT urine dip    nitrofurantoin (MACROBID) 100 mg capsule    POCT urine HCG   2  Acute cystitis with hematuria  nitrofurantoin (MACROBID) 100 mg capsule         Patient Instructions     Patient Instructions     Recommend over-the-counter hydrocortisone 1% cream to areas of itching  Recommend changing bedding and using your regular detergent  May take Benadryl at nighttime and either Zyrtec or Claritin during the day  I also believe you have a urinary tract infection  Take the antibiotics as prescribed  Will send a urine culture, results should be back in 2-3 days  If negative, may discontinue antibiotics  Continue drinking plenty of fluids  If you develop any worsening abdominal pain, vomiting, or fevers, proceed to ER for further evaluation  Follow-up with PCP  Urinary Tract Infection in Women   WHAT YOU NEED TO KNOW:   A urinary tract infection (UTI) is caused by bacteria that get inside your urinary tract  Most bacteria that enter your urinary tract come out when you urinate  If the bacteria stay in your urinary tract, you may get an infection  Your urinary tract includes your kidneys, ureters, bladder, and urethra  Urine is made in your kidneys, and it flows from the ureters to the bladder  Urine leaves the bladder through the urethra  A UTI is more common in your lower urinary tract, which includes your bladder and urethra  DISCHARGE INSTRUCTIONS:   Return to the emergency department if:   · You are urinating very little or not at all  · You have a high fever with shaking chills  · You have side or back pain that gets worse  Call your doctor if:   · You have a fever  · You do not feel better after 2 days of taking antibiotics      · You are vomiting  · You have questions or concerns about your condition or care  Medicines:   · Antibiotics  help fight a bacterial infection  If you have UTIs often (called recurrent UTIs), you may be given antibiotics to take regularly  You will be given directions for when and how to use antibiotics  The goal is to prevent UTIs but not cause antibiotic resistance by using antibiotics too often  · Medicines  may be given to decrease pain and burning when you urinate  They will also help decrease the feeling that you need to urinate often  These medicines will make your urine orange or red  · Take your medicine as directed  Contact your healthcare provider if you think your medicine is not helping or if you have side effects  Tell him or her if you are allergic to any medicine  Keep a list of the medicines, vitamins, and herbs you take  Include the amounts, and when and why you take them  Bring the list or the pill bottles to follow-up visits  Carry your medicine list with you in case of an emergency  Follow up with your doctor as directed:  Write down your questions so you remember to ask them during your visits  Prevent another UTI:   · Empty your bladder often  Urinate and empty your bladder as soon as you feel the need  Do not hold your urine for long periods of time  · Wipe from front to back after you urinate or have a bowel movement  This will help prevent germs from getting into your urinary tract through your urethra  · Drink liquids as directed  Ask how much liquid to drink each day and which liquids are best for you  You may need to drink more liquids than usual to help flush out the bacteria  Do not drink alcohol, caffeine, or citrus juices  These can irritate your bladder and increase your symptoms  Your healthcare provider may recommend cranberry juice to help prevent a UTI  · Urinate after you have sex  This can help flush out bacteria passed during sex      · Do not douche or use feminine deodorants  These can change the chemical balance in your vagina  · Change sanitary pads or tampons often  This will help prevent germs from getting into your urinary tract  · Talk to your healthcare provider about your birth control method  You may need to change your method if it is increasing your risk for UTIs  · Wear cotton underwear and clothes that are loose  Tight pants and nylon underwear can trap moisture and cause bacteria to grow  · Vaginal estrogen may be recommended  This medicine helps prevent UTIs in women who have gone through menopause or are in destinee-menopause  · Do pelvic muscle exercises often  Pelvic muscle exercises may help you start and stop urinating  Strong pelvic muscles may help you empty your bladder easier  Squeeze these muscles tightly for 5 seconds like you are trying to hold back urine  Then relax for 5 seconds  Gradually work up to squeezing for 10 seconds  Do 3 sets of 15 repetitions a day, or as directed  © Copyright [employer] 2022 Information is for End User's use only and may not be sold, redistributed or otherwise used for commercial purposes  All illustrations and images included in CareNotes® are the copyrighted property of [employer]  or [facility] Shaquille fide   The above information is an  only  It is not intended as medical advice for individual conditions or treatments  Talk to your doctor, nurse or pharmacist before following any medical regimen to see if it is safe and effective for you  Follow up with PCP in 3-5 days  Proceed to  ER if symptoms worsen  Chief Complaint     Chief Complaint   Patient presents with    Abdominal Pain     Started on Thursday, vomiting/diarrhea, Hives on her stomach that started today at work         History of Present Illness       HPI   Tanesha Lin is a 25 y o  female who presents today for evaluation of vomiting and diarrhea that started 3 days ago    Patient unsure if it was related to consumption of spinach dip which she reports she has not had in a long time  Patient reports history of stomach problems" with some sensitivities to food  Patient reports the vomiting and diarrhea have stopped, but is having lower abdominal pain  Today she noticed hives appearing on her stomach and left lower back  She reports sleeping at her sister's house, and think she might have had a reaction to her bed linens which were laundered in a different detergent than hers  Patient denies any fevers, chills, cough, or shortness of breath  She has not applied anything to the hives  She has not taken anything for the stomach pain, vomiting, or diarrhea  She denies any recent known sick contacts  She does state that she has increased urinary frequency  Denies any flank pain, or dysuria  Review of Systems   Review of Systems   Constitutional: Negative for chills and fever  HENT: Negative  Eyes: Negative  Respiratory: Negative for cough and shortness of breath  Cardiovascular: Negative for chest pain and palpitations  Gastrointestinal: Positive for abdominal pain, diarrhea (resolved) and vomiting (resolved)  Negative for nausea  Genitourinary: Positive for frequency  Negative for decreased urine volume, dysuria, flank pain, hematuria and urgency  Musculoskeletal: Negative  Skin: Positive for rash  Neurological: Negative            Current Medications       Current Outpatient Medications:     nitrofurantoin (MACROBID) 100 mg capsule, Take 1 capsule (100 mg total) by mouth 2 (two) times a day for 5 days, Disp: 10 capsule, Rfl: 0    norethindrone-ethinyl estradiol (JUNEL FE 1/20) 1-20 MG-MCG per tablet, Take 1 tablet by mouth daily, Disp: , Rfl:     Current Allergies     Allergies as of 06/13/2022    (No Known Allergies)            The following portions of the patient's history were reviewed and updated as appropriate: allergies, current medications, past family history, past medical history, past social history, past surgical history and problem list      Past Medical History:   Diagnosis Date    Anxiety     Depressed     Seizure [facility])        Past Surgical History:   Procedure Laterality Date    ADENOIDECTOMY      TONSILLECTOMY         Family History   Problem Relation Age of Onset    Hypertension Mother     No Known Problems Father          Medications have been verified  Objective   Pulse 90   Temp 98 4 °F (36 9 °C)   Resp 18   LMP 05/28/2022 (Exact Date)   SpO2 98%        Physical Exam     Physical Exam  Constitutional:       General: She is not in acute distress  Appearance: Normal appearance  Cardiovascular:      Rate and Rhythm: Normal rate and regular rhythm  Heart sounds: Normal heart sounds  Pulmonary:      Effort: Pulmonary effort is normal       Breath sounds: Normal breath sounds  Abdominal:      General: Bowel sounds are normal  There is no distension  Palpations: Abdomen is soft  Tenderness: There is abdominal tenderness in the right lower quadrant, suprapubic area and left lower quadrant  There is no right CVA tenderness or left CVA tenderness  Negative signs include psoas sign and obturator sign  Skin:     General: Skin is warm and dry  Capillary Refill: Capillary refill takes less than 2 seconds  Findings: Rash present  Rash is papular and urticarial           Neurological:      Mental Status: She is alert

## 2022-06-13 NOTE — PATIENT INSTRUCTIONS
Recommend over-the-counter hydrocortisone 1% cream to areas of itching  Recommend changing bedding and using your regular detergent  May take Benadryl at nighttime and either Zyrtec or Claritin during the day  I also believe you have a urinary tract infection  Take the antibiotics as prescribed  Will send a urine culture, results should be back in 2-3 days  If negative, may discontinue antibiotics  Continue drinking plenty of fluids  If you develop any worsening abdominal pain, vomiting, or fevers, proceed to ER for further evaluation  Follow-up with PCP  Urinary Tract Infection in Women   WHAT YOU NEED TO KNOW:   A urinary tract infection (UTI) is caused by bacteria that get inside your urinary tract  Most bacteria that enter your urinary tract come out when you urinate  If the bacteria stay in your urinary tract, you may get an infection  Your urinary tract includes your kidneys, ureters, bladder, and urethra  Urine is made in your kidneys, and it flows from the ureters to the bladder  Urine leaves the bladder through the urethra  A UTI is more common in your lower urinary tract, which includes your bladder and urethra  DISCHARGE INSTRUCTIONS:   Return to the emergency department if:   You are urinating very little or not at all  You have a high fever with shaking chills  You have side or back pain that gets worse  Call your doctor if:   You have a fever  You do not feel better after 2 days of taking antibiotics  You are vomiting  You have questions or concerns about your condition or care  Medicines:   Antibiotics  help fight a bacterial infection  If you have UTIs often (called recurrent UTIs), you may be given antibiotics to take regularly  You will be given directions for when and how to use antibiotics  The goal is to prevent UTIs but not cause antibiotic resistance by using antibiotics too often  Medicines  may be given to decrease pain and burning when you urinate  They will also help decrease the feeling that you need to urinate often  These medicines will make your urine orange or red  Take your medicine as directed  Contact your healthcare provider if you think your medicine is not helping or if you have side effects  Tell him or her if you are allergic to any medicine  Keep a list of the medicines, vitamins, and herbs you take  Include the amounts, and when and why you take them  Bring the list or the pill bottles to follow-up visits  Carry your medicine list with you in case of an emergency  Follow up with your doctor as directed:  Write down your questions so you remember to ask them during your visits  Prevent another UTI:   Empty your bladder often  Urinate and empty your bladder as soon as you feel the need  Do not hold your urine for long periods of time  Wipe from front to back after you urinate or have a bowel movement  This will help prevent germs from getting into your urinary tract through your urethra  Drink liquids as directed  Ask how much liquid to drink each day and which liquids are best for you  You may need to drink more liquids than usual to help flush out the bacteria  Do not drink alcohol, caffeine, or citrus juices  These can irritate your bladder and increase your symptoms  Your healthcare provider may recommend cranberry juice to help prevent a UTI  Urinate after you have sex  This can help flush out bacteria passed during sex  Do not douche or use feminine deodorants  These can change the chemical balance in your vagina  Change sanitary pads or tampons often  This will help prevent germs from getting into your urinary tract  Talk to your healthcare provider about your birth control method  You may need to change your method if it is increasing your risk for UTIs  Wear cotton underwear and clothes that are loose  Tight pants and nylon underwear can trap moisture and cause bacteria to grow      Vaginal estrogen may be recommended  This medicine helps prevent UTIs in women who have gone through menopause or are in destinee-menopause  Do pelvic muscle exercises often  Pelvic muscle exercises may help you start and stop urinating  Strong pelvic muscles may help you empty your bladder easier  Squeeze these muscles tightly for 5 seconds like you are trying to hold back urine  Then relax for 5 seconds  Gradually work up to squeezing for 10 seconds  Do 3 sets of 15 repetitions a day, or as directed  © Copyright [employer] 2022 Information is for End User's use only and may not be sold, redistributed or otherwise used for commercial purposes  All illustrations and images included in CareNotes® are the copyrighted property of [employer]  or [facility] Shaquille Montiel   The above information is an  only  It is not intended as medical advice for individual conditions or treatments  Talk to your doctor, nurse or pharmacist before following any medical regimen to see if it is safe and effective for you

## 2022-06-15 LAB — BACTERIA UR CULT: NORMAL

## 2022-09-06 ENCOUNTER — CLINICAL SUPPORT (OUTPATIENT)
Dept: FAMILY MEDICINE CLINIC | Facility: CLINIC | Age: 22
End: 2022-09-06
Payer: COMMERCIAL

## 2022-09-06 DIAGNOSIS — Z11.1 SCREENING FOR TUBERCULOSIS: Primary | ICD-10-CM

## 2022-09-06 PROCEDURE — 86580 TB INTRADERMAL TEST: CPT

## 2022-09-06 PROCEDURE — 99211 OFF/OP EST MAY X REQ PHY/QHP: CPT

## 2022-09-08 LAB
INDURATION: 0 MM
TB SKIN TEST: NEGATIVE

## 2022-10-14 ENCOUNTER — VBI (OUTPATIENT)
Dept: ADMINISTRATIVE | Facility: OTHER | Age: 22
End: 2022-10-14

## 2022-11-30 ENCOUNTER — OFFICE VISIT (OUTPATIENT)
Dept: URGENT CARE | Facility: CLINIC | Age: 22
End: 2022-11-30

## 2022-11-30 VITALS
WEIGHT: 148 LBS | BODY MASS INDEX: 27.23 KG/M2 | HEART RATE: 70 BPM | RESPIRATION RATE: 18 BRPM | OXYGEN SATURATION: 99 % | DIASTOLIC BLOOD PRESSURE: 70 MMHG | HEIGHT: 62 IN | SYSTOLIC BLOOD PRESSURE: 110 MMHG | TEMPERATURE: 98.6 F

## 2022-11-30 DIAGNOSIS — H65.191 ACUTE EFFUSION OF RIGHT EAR: Primary | ICD-10-CM

## 2022-11-30 RX ORDER — FLUTICASONE PROPIONATE 50 MCG
2 SPRAY, SUSPENSION (ML) NASAL DAILY
Qty: 15.8 ML | Refills: 0 | Status: SHIPPED | OUTPATIENT
Start: 2022-11-30

## 2022-11-30 NOTE — PROGRESS NOTES
[address] [facility]        NAME: Marley Murrell is a 25 y o  female  : 2000    MRN: [id number]  DATE: 2022  TIME: 3:54 PM      Assessment and Plan     Acute effusion of right ear [H65 191]  1  Acute effusion of right ear  fluticasone (FLONASE) 50 mcg/act nasal spray            Patient Instructions   Use flonase as directed  Acetaminophen for pain  PCP follow-up in 3-5 days  Proceed to the ER if symptoms worsen  Chief Complaint     Chief Complaint   Patient presents with   • Ear Fullness     Patient reports she cannot hear out of right ear  Started three days ago  History of Present Illness     Patient is a 52-year-old female who presents with fullness sensation to right ear worsening over the past 3 days with decreased hearing  States she has been congested  Denies fever chills  Denies cough  Denies nausea, vomiting, or diarrhea  Denies chance of pregnancy  Denies history of diabetes  Review of Systems     Review of Systems   Constitutional: Negative for chills and fever  HENT: Positive for ear pain and hearing loss  Negative for congestion, rhinorrhea and sore throat  Respiratory: Negative for cough  Gastrointestinal: Negative for diarrhea, nausea and vomiting  All other systems reviewed and are negative          Current Medications       Current Outpatient Medications:   •  fluticasone (FLONASE) 50 mcg/act nasal spray, 2 sprays into each nostril daily, Disp: 15 8 mL, Rfl: 0  •  norethindrone-ethinyl estradiol (JUNEL FE 1/20) 1-20 MG-MCG per tablet, Take 1 tablet by mouth daily, Disp: , Rfl:     Current Allergies     Allergies as of 2022   • (No Known Allergies)              The following portions of the patient's history were reviewed and updated as appropriate: allergies, current medications, past family history, past medical history, past social history, past surgical history and problem list      Past Medical History:   Diagnosis Date   • Anxiety    • Depressed    • Seizure [facility])        Past Surgical History:   Procedure Laterality Date   • ADENOIDECTOMY     • TONSILLECTOMY         Family History   Problem Relation Age of Onset   • Hypertension Mother    • No Known Problems Father          Medications have been verified  Objective     /70   Pulse 70   Temp 98 6 °F (37 °C) (Temporal)   Resp 18   Ht 5' 2" (1 575 m)   Wt 67 1 kg (148 lb)   SpO2 99%   BMI 27 07 kg/m²   No LMP recorded  (Menstrual status: Birth Control)  Physical Exam     Physical Exam  Vitals and nursing note reviewed  Constitutional:       General: She is awake  She is not in acute distress  Appearance: Normal appearance  She is not ill-appearing, toxic-appearing or diaphoretic  HENT:      Right Ear: Ear canal and external ear normal  A middle ear effusion is present  Tympanic membrane is not erythematous  Left Ear: Tympanic membrane, ear canal and external ear normal  Tympanic membrane is not erythematous  Nose: Congestion present  Cardiovascular:      Rate and Rhythm: Normal rate  Pulses: Normal pulses  Heart sounds: Normal heart sounds, S1 normal and S2 normal    Pulmonary:      Effort: Pulmonary effort is normal       Breath sounds: Normal breath sounds and air entry  Skin:     General: Skin is warm  Capillary Refill: Capillary refill takes less than 2 seconds  Neurological:      Mental Status: She is alert  Psychiatric:         Mood and Affect: Mood normal          Behavior: Behavior normal          Thought Content:  Thought content normal          Judgment: Judgment normal

## 2022-11-30 NOTE — PATIENT INSTRUCTIONS
Use flonase as directed  Acetaminophen for pain  PCP follow-up in 3-5 days  Proceed to the ER if symptoms worsen

## 2022-12-08 ENCOUNTER — OFFICE VISIT (OUTPATIENT)
Dept: FAMILY MEDICINE CLINIC | Facility: CLINIC | Age: 22
End: 2022-12-08

## 2022-12-08 VITALS
WEIGHT: 151.6 LBS | OXYGEN SATURATION: 98 % | RESPIRATION RATE: 17 BRPM | BODY MASS INDEX: 27.9 KG/M2 | HEIGHT: 62 IN | DIASTOLIC BLOOD PRESSURE: 62 MMHG | SYSTOLIC BLOOD PRESSURE: 100 MMHG | TEMPERATURE: 98.1 F | HEART RATE: 78 BPM

## 2022-12-08 DIAGNOSIS — H69.81 DYSFUNCTION OF RIGHT EUSTACHIAN TUBE: Primary | ICD-10-CM

## 2022-12-08 PROBLEM — F33.2 SEVERE EPISODE OF RECURRENT MAJOR DEPRESSIVE DISORDER, WITHOUT PSYCHOTIC FEATURES (HCC): Status: ACTIVE | Noted: 2022-12-08

## 2022-12-08 RX ORDER — PREDNISONE 20 MG/1
TABLET ORAL
Qty: 12 TABLET | Refills: 0 | Status: SHIPPED | OUTPATIENT
Start: 2022-12-08 | End: 2022-12-16

## 2022-12-08 NOTE — PROGRESS NOTES
[facility]        NAME: Serafin Brantley is a 25 y o  female  : 2000    MRN: [id number]  DATE: 2022  TIME: 7:08 AM    Assessment and Plan   Dysfunction of right eustachian tube [H69 81]  1  Dysfunction of right eustachian tube  predniSONE 20 mg tablet        1  Dysfunction of right eustachian tube  - continue flonase, start prednisone, can take mucinex and sudafed  - predniSONE 20 mg tablet; Take 1 tablet (20 mg total) by mouth 2 (two) times a day with meals for 4 days, THEN 1 tablet (20 mg total) daily for 4 days  Dispense: 12 tablet; Refill: 0      Patient Instructions     Patient Instructions     Eustachian Tube Dysfunction   AMBULATORY CARE:   Eustachian tube dysfunction (ETD)  is a condition that prevents your eustachian tubes from opening properly  It can also cause them to become blocked  Eustachian tubes connect your middle ear to the back of your nose and throat  These tubes open and allow air to flow in and out when you sneeze, swallow, or yawn  Common signs and symptoms include the following:   · Fullness or pressure in your ears    · Muffled hearing, or a feeling you are hearing under water or have clogged ears    · Pain in one or both ears    · Ringing in your ears    · Popping, crackling, or clicking feeling in your ears    · Trouble keeping your balance    Call your doctor or otolaryngologist if:   · Your symptoms do not improve or get worse  · You have a fever  · You have any hearing loss  · You have questions or concerns about your condition or care  Treatment:  ETD may get better on its own without any treatment  If it continues, you may need any of the following:  · Swallow, yawn, or chew gum  to help open your eustachian tubes  Your healthcare provider may also recommend you blow with your mouth shut and your nostrils pinched closed      · Air pressure devices  push air into your nose and eustachian tubes to help relieve air pressure in your ear  · Treatment for allergies  such as decongestants, antihistamines, and nasal steroids may improve ETD  They may help decrease swelling of the eustachian tubes  · A myringotomy  is surgery to make a hole in your eardrum  The hole relieves pressure and lets fluid drain from your ear  A pressure equalizing (PE) tube may be used to keep the hole open and to help drain fluid  · Tuboplasty  is a procedure to widen your eustachian tubes  Follow up with your doctor or otolaryngologist as directed:  Write down your questions so you remember to ask them during your visits  © Copyright [employer] 2022 Information is for End User's use only and may not be sold, redistributed or otherwise used for commercial purposes  All illustrations and images included in CareNotes® are the copyrighted property of A D A M , Inc  or [employer]   The above information is an  only  It is not intended as medical advice for individual conditions or treatments  Talk to your doctor, nurse or pharmacist before following any medical regimen to see if it is safe and effective for you  Chief Complaint     Chief Complaint   Patient presents with   • Earache     Bilateral          History of Present Illness        Patient here for ear pain and cant hear out of left ear for about 1 week  Has been taking flonase for the last week, also taking mucinex  Denies fever congestion  Was seen in urgent care last week  Review of Systems   Review of Systems   Constitutional: Negative  Negative for fatigue and fever  HENT: Positive for ear pain  Negative for congestion and postnasal drip  Respiratory: Negative for shortness of breath and wheezing  Cardiovascular: Negative  Skin: Negative  Negative for rash  Neurological: Negative  Negative for dizziness, light-headedness and headaches  Psychiatric/Behavioral: Negative          PHQ-2/9 Depression Screening    Little interest or pleasure in doing things: 0 - not at all  Feeling down, depressed, or hopeless: 0 - not at all  Trouble falling or staying asleep, or sleeping too much: 0 - not at all  Feeling tired or having little energy: 0 - not at all  Poor appetite or overeatin - not at all  Feeling bad about yourself - or that you are a failure or have let yourself or your family down: 0 - not at all  Trouble concentrating on things, such as reading the newspaper or watching television: 0 - not at all  Moving or speaking so slowly that other people could have noticed  Or the opposite - being so fidgety or restless that you have been moving around a lot more than usual: 0 - not at all  Thoughts that you would be better off dead, or of hurting yourself in some way: 0 - not at all  PHQ-9 Score: 0   PHQ-9 Interpretation: No or Minimal depression         Current Medications       Current Outpatient Medications:   •  fluticasone (FLONASE) 50 mcg/act nasal spray, 2 sprays into each nostril daily, Disp: 15 8 mL, Rfl: 0  •  norethindrone-ethinyl estradiol (JUNEL FE 1/20) 1-20 MG-MCG per tablet, Take 1 tablet by mouth daily, Disp: , Rfl:   •  predniSONE 20 mg tablet, Take 1 tablet (20 mg total) by mouth 2 (two) times a day with meals for 4 days, THEN 1 tablet (20 mg total) daily for 4 days  , Disp: 12 tablet, Rfl: 0    Current Allergies     Allergies as of 2022   • (No Known Allergies)            The following portions of the patient's history were reviewed and updated as appropriate: allergies, current medications, past family history, past medical history, past social history, past surgical history and problem list      Past Medical History:   Diagnosis Date   • Anxiety    • Depressed    • Seizure ([facility] [address] )        Past Surgical History:   Procedure Laterality Date   • ADENOIDECTOMY     • TONSILLECTOMY         Family History   Problem Relation Age of Onset   • Hypertension Mother    • No Known Problems Father          Medications have been verified  Objective   /62   Pulse 78   Temp 98 1 °F (36 7 °C)   Resp 17   Ht 5' 2" (1 575 m)   Wt 68 8 kg (151 lb 9 6 oz)   SpO2 98%   BMI 27 73 kg/m²        Physical Exam     Physical Exam  Vitals and nursing note reviewed  Constitutional:       General: She is not in acute distress  Appearance: She is well-developed  She is not ill-appearing or diaphoretic  HENT:      Head: Normocephalic and atraumatic  Right Ear: Tympanic membrane, ear canal and external ear normal       Left Ear: Tympanic membrane, ear canal and external ear normal       Nose: Nose normal  No laceration, mucosal edema or rhinorrhea  Mouth/Throat:      Pharynx: Uvula midline  Pulmonary:      Effort: No bradypnea or accessory muscle usage  Breath sounds: No decreased breath sounds  Skin:     General: Skin is warm  Capillary Refill: Capillary refill takes less than 2 seconds  Coloration: Skin is not pale  Findings: No erythema or rash  Neurological:      Mental Status: She is alert and oriented to person, place, and time  Psychiatric:         Behavior: Behavior normal  Behavior is cooperative  Thought Content:  Thought content normal          Judgment: Judgment normal

## 2022-12-08 NOTE — PATIENT INSTRUCTIONS
Eustachian Tube Dysfunction   AMBULATORY CARE:   Eustachian tube dysfunction (ETD)  is a condition that prevents your eustachian tubes from opening properly  It can also cause them to become blocked  Eustachian tubes connect your middle ear to the back of your nose and throat  These tubes open and allow air to flow in and out when you sneeze, swallow, or yawn  Common signs and symptoms include the following:   Fullness or pressure in your ears    Muffled hearing, or a feeling you are hearing under water or have clogged ears    Pain in one or both ears    Ringing in your ears    Popping, crackling, or clicking feeling in your ears    Trouble keeping your balance    Call your doctor or otolaryngologist if:   Your symptoms do not improve or get worse  You have a fever  You have any hearing loss  You have questions or concerns about your condition or care  Treatment:  ETD may get better on its own without any treatment  If it continues, you may need any of the following:  Swallow, yawn, or chew gum  to help open your eustachian tubes  Your healthcare provider may also recommend you blow with your mouth shut and your nostrils pinched closed  Air pressure devices  push air into your nose and eustachian tubes to help relieve air pressure in your ear  Treatment for allergies  such as decongestants, antihistamines, and nasal steroids may improve ETD  They may help decrease swelling of the eustachian tubes  A myringotomy  is surgery to make a hole in your eardrum  The hole relieves pressure and lets fluid drain from your ear  A pressure equalizing (PE) tube may be used to keep the hole open and to help drain fluid  Tuboplasty  is a procedure to widen your eustachian tubes  Follow up with your doctor or otolaryngologist as directed:  Write down your questions so you remember to ask them during your visits    © Copyright [employer] 2022 Information is for End User's use only and may not be sold, redistributed or otherwise used for commercial purposes  All illustrations and images included in CareNotes® are the copyrighted property of A D A M , Inc  or [employer]  The above information is an  only  It is not intended as medical advice for individual conditions or treatments  Talk to your doctor, nurse or pharmacist before following any medical regimen to see if it is safe and effective for you

## 2022-12-14 ENCOUNTER — VBI (OUTPATIENT)
Dept: ADMINISTRATIVE | Facility: OTHER | Age: 22
End: 2022-12-14

## 2022-12-20 ENCOUNTER — CLINICAL SUPPORT (OUTPATIENT)
Dept: FAMILY MEDICINE CLINIC | Facility: CLINIC | Age: 22
End: 2022-12-20

## 2022-12-20 DIAGNOSIS — Z11.1 SCREENING FOR TUBERCULOSIS: Primary | ICD-10-CM

## 2022-12-22 LAB
INDURATION: 0 MM
TB SKIN TEST: NEGATIVE

## 2023-02-20 ENCOUNTER — TELEPHONE (OUTPATIENT)
Dept: FAMILY MEDICINE CLINIC | Facility: CLINIC | Age: 23
End: 2023-02-20

## 2023-02-21 NOTE — TELEPHONE ENCOUNTER
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

## 2023-02-21 NOTE — TELEPHONE ENCOUNTER
Are there any forms? Or does she just need a letter  We can write a letter if this is what she needs, just stating that she had a physical and the date

## 2023-02-22 NOTE — TELEPHONE ENCOUNTER
Called and notified patient  She will call me in the am with a fax number to send completed form too  Also wanted to notify Trena Blakely that therapy helped her a ton and she feels so much better

## 2023-02-23 NOTE — TELEPHONE ENCOUNTER
Patient returned call with fax number  F: [phone] Attn: Nai Martini given to MR to scan into patient's chart

## 2023-02-23 NOTE — TELEPHONE ENCOUNTER
Form completed  Notified patient  She said she will call back around 3pm with fax number for it to be sent to  Form placed in Brittas folder at [employer]

## 2023-05-02 ENCOUNTER — TELEPHONE (OUTPATIENT)
Dept: FAMILY MEDICINE CLINIC | Facility: CLINIC | Age: 23
End: 2023-05-02

## 2023-05-02 NOTE — TELEPHONE ENCOUNTER
[facility] called spoke to one of the staff about a auth, the referral placed today, the date was to be for yesterday and instead of placed for today,

## 2023-05-03 NOTE — TELEPHONE ENCOUNTER
Unable to [employer] referral on availity  Called and spoke with insurance company  They are going to fax a form over to our office to complete insurance referral and can fax back once completed  Awaiting forms

## 2023-05-03 NOTE — TELEPHONE ENCOUNTER
Patient called asking for update  Informed we are waiting for forms to complete from insurance company and we will be in touch with her

## 2023-07-31 ENCOUNTER — TELEPHONE (OUTPATIENT)
Dept: FAMILY MEDICINE CLINIC | Facility: CLINIC | Age: 23
End: 2023-07-31

## 2023-07-31 NOTE — TELEPHONE ENCOUNTER
Patient called stating she needs a physical form completed for her job. Needs to have completed by 8/7/23. Last physical 5/2022. Please advise.

## 2023-08-01 NOTE — TELEPHONE ENCOUNTER
I can double book my 11:30 tomorrow since it is a suture removal, we would only do the physical no problems. This would be the only time before 8/7/23.  If she cannot make this, I did see her on 12/8/22 and could fill out the form but she would need to come in for an annual physical

## 2023-08-02 ENCOUNTER — OFFICE VISIT (OUTPATIENT)
Dept: FAMILY MEDICINE CLINIC | Facility: CLINIC | Age: 23
End: 2023-08-02
Payer: COMMERCIAL

## 2023-08-02 ENCOUNTER — TELEPHONE (OUTPATIENT)
Dept: ADMINISTRATIVE | Facility: OTHER | Age: 23
End: 2023-08-02

## 2023-08-02 VITALS
TEMPERATURE: 98.7 F | DIASTOLIC BLOOD PRESSURE: 66 MMHG | WEIGHT: 143 LBS | HEART RATE: 70 BPM | BODY MASS INDEX: 26.31 KG/M2 | SYSTOLIC BLOOD PRESSURE: 110 MMHG | OXYGEN SATURATION: 98 % | HEIGHT: 62 IN

## 2023-08-02 DIAGNOSIS — Z00.00 ANNUAL PHYSICAL EXAM: Primary | ICD-10-CM

## 2023-08-02 PROCEDURE — 99385 PREV VISIT NEW AGE 18-39: CPT | Performed by: NURSE PRACTITIONER

## 2023-08-02 RX ORDER — NORELGESTROMIN AND ETHINYL ESTRADIOL 150; 35 UG/D; UG/D
PATCH TRANSDERMAL
COMMUNITY
Start: 2023-07-12

## 2023-08-02 NOTE — TELEPHONE ENCOUNTER
----- Message from Gardenia Mack sent at 8/2/2023  8:08 AM EDT -----  Regarding: PAP  08/02/23 8:08 AM    Rui, our patient Annita Gamez has had Pap Smear (HPV) aka Cervical Cancer Screening completed/performed. Please assist in updating the patient chart by pulling the Care Everywhere (CE) document. The date of service is 5/12/2023.      Thank you,  Gardenia Mack  [facility] [address]

## 2023-08-02 NOTE — TELEPHONE ENCOUNTER
Upon review of the In Basket request we were able to locate, review, and update the patient chart as requested for Pap Smear (HPV) aka Cervical Cancer Screening. Any additional questions or concerns should be emailed to the Practice Liaisons via the appropriate education email address, please do not reply via In Basket.     Thank you  Stephenroyal Amaral

## 2023-08-02 NOTE — PROGRESS NOTES
[facility]ULT [facility]NUAL [facility]    NAME: Anoop Salazar  AGE: 26 y.o. SEX: female  : 2000     DATE: 2023     Assessment and Plan:     Problem List Items Addressed This Visit    None  Visit Diagnoses     Annual physical exam    -  Primary          Immunizations and preventive care screenings were discussed with patient today. Appropriate education was printed on patient's after visit summary. Counseling:  Alcohol/drug use: discussed moderation in alcohol intake, the recommendations for healthy alcohol use, and avoidance of illicit drug use. Dental Health: discussed importance of regular tooth brushing, flossing, and dental visits. Sexual health: discussed sexually transmitted diseases, partner selection, use of condoms, avoidance of unintended pregnancy, and contraceptive alternatives. · Exercise: the importance of regular exercise/physical activity was discussed. Recommend exercise 3-5 times per week for at least 30 minutes. Return in 1 year (on 2024). Chief Complaint:     Chief Complaint   Patient presents with   • Annual Exam      History of Present Illness:     Adult Annual Physical   Patient here for a comprehensive physical exam. The patient reports no problems. Diet and Physical Activity  · Diet/Nutrition: well balanced diet, limited junk food and consuming 3-5 servings of fruits/vegetables daily. · Exercise: walking and 5-7 times a week on average.       Depression Screening  PHQ-2/9 Depression Screening    Little interest or pleasure in doing things: 0 - not at all  Feeling down, depressed, or hopeless: 0 - not at all  Trouble falling or staying asleep, or sleeping too much: 0 - not at all  Feeling tired or having little energy: 0 - not at all  Poor appetite or overeatin - not at all  Feeling bad about yourself - or that you are a failure or have let yourself or your family down: 0 - not at all  Trouble concentrating on things, such as reading the newspaper or watching television: 0 - not at all  Moving or speaking so slowly that other people could have noticed. Or the opposite - being so fidgety or restless that you have been moving around a lot more than usual: 0 - not at all  Thoughts that you would be better off dead, or of hurting yourself in some way: 0 - not at all  PHQ-9 Score: 0   PHQ-9 Interpretation: No or Minimal depression        General Health  · Sleep: sleeps well and gets more than 8 hours of sleep on average. · Hearing: normal - bilateral.  · Vision: goes for regular eye exams, most recent eye exam <1 year ago and wears contacts. · Dental: regular dental visits and brushes teeth twice daily. /GYN Health  · Last menstrual period: 4 weeks ago, due any day. · Contraceptive method: patches. · History of STDs?: no.     Review of Systems:     Review of Systems   Constitutional: Negative. Negative for activity change, appetite change, chills, fatigue and fever. HENT: Positive for hearing loss. Negative for congestion, ear pain, nosebleeds, rhinorrhea and sore throat. Eyes: Negative for photophobia, pain, redness and visual disturbance. Respiratory: Negative for cough, shortness of breath and wheezing. Cardiovascular: Negative. Negative for chest pain. Gastrointestinal: Negative. Negative for abdominal pain, constipation, diarrhea and vomiting. Endocrine: Negative. Genitourinary: Negative for difficulty urinating, dysuria and flank pain. Musculoskeletal: Negative. Skin: Negative for color change and rash. Neurological: Negative for dizziness, weakness, numbness and headaches. Hematological: Negative for adenopathy. Psychiatric/Behavioral: Negative for agitation and confusion. The patient is not nervous/anxious.        Past Medical History:     Past Medical History:   Diagnosis Date   • Anxiety    • Depressed    • Seizure [facility])       Past Surgical History:     Past Surgical History:   Procedure Laterality Date   • ADENOIDECTOMY     • TONSILLECTOMY        Social History:     Social History     Socioeconomic History   • Marital status: Single     Spouse name: None   • Number of children: None   • Years of education: None   • Highest education level: None   Occupational History   • None   Tobacco Use   • Smoking status: Never   • Smokeless tobacco: Never   Vaping Use   • Vaping Use: Never used   Substance and Sexual Activity   • Alcohol use: Yes     Comment: socially   • Drug use: Never   • Sexual activity: Yes     Partners: Male     Birth control/protection: OCP, Condom Male   Other Topics Concern   • None   Social History Narrative   • None     Social Determinants of Health     Financial Resource Strain: Not on file   Food Insecurity: Not on file   Transportation Needs: Not on file   Physical Activity: Not on file   Stress: Not on file   Social Connections: Not on file   Intimate Partner Violence: Not on file   Housing Stability: Not on file      Family History:     Family History   Problem Relation Age of Onset   • Hypertension Mother    • No Known Problems Father       Current Medications:     Current Outpatient Medications   Medication Sig Dispense Refill   • azelastine (ASTELIN) 0.1 % nasal spray 2 sprays into each nostril 2 (two) times a day Use in each nostril as directed 30 mL 6   • Carbamide Peroxide (EAR DROPS OT) Administer into ears     • fluticasone (FLONASE) 50 mcg/act nasal spray 2 sprays into each nostril daily 15.8 mL 0   • oxymetazoline (AFRIN) 0.05 % nasal spray 2 sprays by Each Nare route 2 (two) times a day     • Xulane 150-35 MCG/24HR apply 1 patch topically every week       No current facility-administered medications for this visit.       Allergies:     No Known Allergies   Physical Exam:     /66   Pulse 70   Temp 98.7 °F (37.1 °C)   Ht 5' 2" (1.575 m)   Wt 64.9 kg (143 lb)   SpO2 98%   BMI 26.16 kg/m²     Physical Exam  Vitals and nursing note reviewed. Constitutional:       General: She is not in acute distress. Appearance: She is well-developed. She is not ill-appearing or diaphoretic. HENT:      Head: Normocephalic and atraumatic. Right Ear: Hearing, tympanic membrane and ear canal normal.      Left Ear: Hearing, tympanic membrane and ear canal normal.      Nose: Nose normal.      Mouth/Throat:      Pharynx: Uvula midline. Eyes:      General: Lids are normal.      Conjunctiva/sclera: Conjunctivae normal.   Cardiovascular:      Rate and Rhythm: Normal rate and regular rhythm. Heart sounds: Normal heart sounds, S1 normal and S2 normal. No murmur heard. No gallop. Pulmonary:      Effort: Pulmonary effort is normal. No respiratory distress. Breath sounds: Normal breath sounds. Musculoskeletal:         General: No tenderness. Normal range of motion. Lymphadenopathy:      Cervical: No cervical adenopathy. Skin:     General: Skin is warm. Capillary Refill: Capillary refill takes less than 2 seconds. Findings: No rash. Neurological:      Mental Status: She is alert. Psychiatric:         Behavior: Behavior normal. Behavior is cooperative. Thought Content:  Thought content normal.         Judgment: Judgment normal.          BOB Mccarthy   [facility]

## 2023-08-18 ENCOUNTER — OFFICE VISIT (OUTPATIENT)
Dept: URGENT CARE | Facility: CLINIC | Age: 23
End: 2023-08-18
Payer: COMMERCIAL

## 2023-08-18 VITALS
WEIGHT: 143 LBS | HEIGHT: 62 IN | TEMPERATURE: 97.9 F | SYSTOLIC BLOOD PRESSURE: 110 MMHG | OXYGEN SATURATION: 97 % | RESPIRATION RATE: 18 BRPM | DIASTOLIC BLOOD PRESSURE: 62 MMHG | BODY MASS INDEX: 26.31 KG/M2 | HEART RATE: 93 BPM

## 2023-08-18 DIAGNOSIS — H10.31 ACUTE BACTERIAL CONJUNCTIVITIS OF RIGHT EYE: ICD-10-CM

## 2023-08-18 DIAGNOSIS — J02.9 SORE THROAT: Primary | ICD-10-CM

## 2023-08-18 LAB
S PYO AG THROAT QL: NEGATIVE
SARS-COV-2 AG UPPER RESP QL IA: NEGATIVE
VALID CONTROL: NORMAL

## 2023-08-18 PROCEDURE — G0382 LEV 3 HOSP TYPE B ED VISIT: HCPCS | Performed by: NURSE PRACTITIONER

## 2023-08-18 PROCEDURE — 87811 SARS-COV-2 COVID19 W/OPTIC: CPT | Performed by: NURSE PRACTITIONER

## 2023-08-18 PROCEDURE — 87880 STREP A ASSAY W/OPTIC: CPT | Performed by: NURSE PRACTITIONER

## 2023-08-18 PROCEDURE — S9083 URGENT CARE CENTER GLOBAL: HCPCS | Performed by: NURSE PRACTITIONER

## 2023-08-18 PROCEDURE — 87070 CULTURE OTHR SPECIMN AEROBIC: CPT | Performed by: NURSE PRACTITIONER

## 2023-08-18 RX ORDER — OFLOXACIN 3 MG/ML
SOLUTION/ DROPS OPHTHALMIC
Qty: 5 ML | Refills: 0 | Status: SHIPPED | OUTPATIENT
Start: 2023-08-18 | End: 2023-08-18

## 2023-08-18 RX ORDER — TOBRAMYCIN 3 MG/ML
1 SOLUTION/ DROPS OPHTHALMIC
Qty: 5 ML | Refills: 0 | Status: SHIPPED | OUTPATIENT
Start: 2023-08-18

## 2023-08-18 NOTE — PATIENT INSTRUCTIONS
Take antibiotic as directed. Discard toothbrush in 48 hours. Drink plenty of fluids, rest, saltwater gargles, lozenges, hot tea with honey. Tylenol or motrin for pain. If you develop a prolonged high fever, difficulty breathing, swallowing, managing secretions, decreased fluid intake, or urination, any new or concerning symptoms please return or proceed ER. Recommend following up with PCP in 3-5 days. Use drops as directed. Frequent hand washing. Discard contacts and case.  Clean pillow case daily

## 2023-08-18 NOTE — PROGRESS NOTES
[facility]        NAME: Rox Flor is a 26 y.o. female  : 2000    MRN: [id number]  DATE: 2023  TIME: 10:31 AM    Assessment and Plan   Sore throat [J02.9]  1. Sore throat  Poct Covid 19 Rapid Antigen Test    POCT rapid strepA    Throat culture      2. Acute bacterial conjunctivitis of right eye  ofloxacin (OCUFLOX) 0.3 % ophthalmic solution        Rapid strep and covid negative. Will send throat culture. Patient Instructions     Patient Instructions   Take antibiotic as directed. Discard toothbrush in 48 hours. Drink plenty of fluids, rest, saltwater gargles, lozenges, hot tea with honey. Tylenol or motrin for pain. If you develop a prolonged high fever, difficulty breathing, swallowing, managing secretions, decreased fluid intake, or urination, any new or concerning symptoms please return or proceed ER. Recommend following up with PCP in 3-5 days. Use drops as directed. Frequent hand washing. Discard contacts and case. Clean pillow case daily        Chief Complaint     Chief Complaint   Patient presents with   • Sore Throat     Patient c/o sore throat that started 3 days ago. • Eye Problem     Patient c/o right eye crusting and irritation that started 3 days ago. History of Present Illness       Sore Throat   This is a new problem. Episode onset: 3 days ago. The problem has been unchanged. Neither side of throat is experiencing more pain than the other. The pain is mild. Associated symptoms include congestion and coughing. Pertinent negatives include no abdominal pain, diarrhea, ear discharge, ear pain, headaches, hoarse voice, plugged ear sensation, neck pain, shortness of breath, stridor, swollen glands, trouble swallowing or vomiting. She has had exposure to strep. She has tried acetaminophen for the symptoms. The treatment provided mild relief. Eye Problem   This is a new problem. The current episode started today. The problem occurs constantly. There was no injury mechanism. The pain is mild. There is no known exposure to pink eye. She wears contacts. Associated symptoms include an eye discharge, eye redness and itching. Pertinent negatives include no blurred vision, double vision, fever, foreign body sensation, nausea, photophobia, recent URI, vomiting or weakness. She has tried nothing for the symptoms. The treatment provided no relief. Review of Systems   Review of Systems   Constitutional: Negative for chills, diaphoresis, fatigue and fever. HENT: Positive for congestion and sore throat. Negative for ear discharge, ear pain, facial swelling, hoarse voice, mouth sores, postnasal drip, rhinorrhea, sinus pressure, sinus pain and trouble swallowing. Eyes: Positive for discharge, redness and itching. Negative for blurred vision, double vision, photophobia, pain and visual disturbance. Respiratory: Positive for cough. Negative for chest tightness, shortness of breath and stridor. Cardiovascular: Negative for chest pain and palpitations. Gastrointestinal: Negative for abdominal pain, diarrhea, nausea and vomiting. Genitourinary: Negative. Musculoskeletal: Negative for arthralgias, back pain, joint swelling, myalgias, neck pain and neck stiffness. Skin: Negative for rash. Neurological: Negative for dizziness, facial asymmetry, weakness, light-headedness, numbness and headaches. Current Medications       Current Outpatient Medications:   •  azelastine (ASTELIN) 0.1 % nasal spray, 2 sprays into each nostril 2 (two) times a day Use in each nostril as directed, Disp: 30 mL, Rfl: 6  •  ofloxacin (OCUFLOX) 0.3 % ophthalmic solution, 1-2drops every 2 hours while awake x 2days.  1-2 drops every 4 hours while awake x5days, Disp: 5 mL, Rfl: 0  •  oxymetazoline (AFRIN) 0.05 % nasal spray, 2 sprays by Each Nare route 2 (two) times a day, Disp: , Rfl:   •  Xulane 150-35 MCG/24HR, apply 1 patch topically every week, Disp: , Rfl:   • Carbamide Peroxide (EAR DROPS OT), Administer into ears (Patient not taking: Reported on 8/18/2023), Disp: , Rfl:   •  fluticasone (FLONASE) 50 mcg/act nasal spray, 2 sprays into each nostril daily (Patient not taking: Reported on 8/18/2023), Disp: 15.8 mL, Rfl: 0    Current Allergies     Allergies as of 08/18/2023   • (No Known Allergies)            The following portions of the patient's history were reviewed and updated as appropriate: allergies, current medications, past family history, past medical history, past social history, past surgical history and problem list.     Past Medical History:   Diagnosis Date   • Anxiety    • Depressed    • Seizure ([address])        Past Surgical History:   Procedure Laterality Date   • ADENOIDECTOMY     • TONSILLECTOMY         Family History   Problem Relation Age of Onset   • Hypertension Mother    • No Known Problems Father          Medications have been verified. Objective   /62   Pulse 93   Temp 97.9 °F (36.6 °C) (Temporal)   Resp 18   Ht 5' 2" (1.575 m)   Wt 64.9 kg (143 lb)   LMP 08/15/2023 (Exact Date)   SpO2 97%   BMI 26.16 kg/m²   Patient's last menstrual period was 08/15/2023 (exact date). Physical Exam     Physical Exam  Constitutional:       General: She is not in acute distress. Appearance: She is well-developed. HENT:      Head: Normocephalic and atraumatic. Right Ear: Hearing, tympanic membrane, ear canal and external ear normal.      Left Ear: Hearing, tympanic membrane, ear canal and external ear normal.      Nose: Rhinorrhea present. Right Sinus: No maxillary sinus tenderness or frontal sinus tenderness. Left Sinus: No maxillary sinus tenderness or frontal sinus tenderness. Mouth/Throat:      Pharynx: Oropharynx is clear. Uvula midline. Posterior oropharyngeal erythema present. No oropharyngeal exudate. Tonsils: 0 on the right. 0 on the left.    Eyes:      General: Lids are normal.      Conjunctiva/sclera: Right eye: Right conjunctiva is injected. Exudate present. Pupils: Pupils are equal, round, and reactive to light. Cardiovascular:      Rate and Rhythm: Regular rhythm. Pulses: Normal pulses. Heart sounds: Normal heart sounds, S1 normal and S2 normal.   Pulmonary:      Effort: Pulmonary effort is normal.      Breath sounds: Normal breath sounds. Lymphadenopathy:      Cervical: No cervical adenopathy. Skin:     General: Skin is warm and dry. Capillary Refill: Capillary refill takes less than 2 seconds. Neurological:      Mental Status: She is alert and oriented to person, place, and time.

## 2023-08-20 ENCOUNTER — HOSPITAL ENCOUNTER (EMERGENCY)
Facility: HOSPITAL | Age: 23
Discharge: HOME | End: 2023-08-20
Attending: EMERGENCY MEDICINE | Admitting: EMERGENCY MEDICINE
Payer: COMMERCIAL

## 2023-08-20 VITALS
TEMPERATURE: 99.1 F | HEART RATE: 103 BPM | OXYGEN SATURATION: 97 % | RESPIRATION RATE: 17 BRPM | DIASTOLIC BLOOD PRESSURE: 67 MMHG | SYSTOLIC BLOOD PRESSURE: 99 MMHG

## 2023-08-20 DIAGNOSIS — E86.0 DEHYDRATION: ICD-10-CM

## 2023-08-20 DIAGNOSIS — R11.2 NAUSEA AND VOMITING: ICD-10-CM

## 2023-08-20 DIAGNOSIS — E87.1 HYPONATREMIA: ICD-10-CM

## 2023-08-20 DIAGNOSIS — J02.9 PHARYNGITIS: ICD-10-CM

## 2023-08-20 DIAGNOSIS — H66.90 OTITIS MEDIA: Primary | ICD-10-CM

## 2023-08-20 LAB
ANION GAP SERPL CALCULATED.3IONS-SCNC: 10 MMOL/L
BACTERIA THROAT CULT: NORMAL
BASOPHILS # BLD AUTO: 0.01 THOUSANDS/ÂΜL (ref 0–0.1)
BASOPHILS NFR BLD AUTO: 0 % (ref 0–1)
BUN SERPL-MCNC: 4 MG/DL (ref 5–25)
CALCIUM SERPL-MCNC: 9.4 MG/DL (ref 8.4–10.2)
CHLORIDE SERPL-SCNC: 102 MMOL/L (ref 96–108)
CO2 SERPL-SCNC: 21 MMOL/L (ref 21–32)
CREAT SERPL-MCNC: 0.92 MG/DL (ref 0.6–1.3)
EOSINOPHIL # BLD AUTO: 0 THOUSAND/ÂΜL (ref 0–0.61)
EOSINOPHIL NFR BLD AUTO: 0 % (ref 0–6)
ERYTHROCYTE [DISTWIDTH] IN BLOOD BY AUTOMATED COUNT: 13 % (ref 11.6–15.1)
FLUAV RNA RESP QL NAA+PROBE: NEGATIVE
FLUBV RNA RESP QL NAA+PROBE: NEGATIVE
GFR SERPL CREATININE-BSD FRML MDRD: 88 ML/MIN/1.73SQ M
GLUCOSE SERPL-MCNC: 122 MG/DL (ref 65–140)
HCG SERPL QL: NEGATIVE
HCT VFR BLD AUTO: 39.4 % (ref 34.8–46.1)
HGB BLD-MCNC: 12.5 G/DL (ref 11.5–15.4)
IMM GRANULOCYTES # BLD AUTO: 0.04 THOUSAND/UL (ref 0–0.2)
IMM GRANULOCYTES NFR BLD AUTO: 0 % (ref 0–2)
LYMPHOCYTES # BLD AUTO: 0.76 THOUSANDS/ÂΜL (ref 0.6–4.47)
LYMPHOCYTES NFR BLD AUTO: 7 % (ref 14–44)
MCH RBC QN AUTO: 26.7 PG (ref 26.8–34.3)
MCHC RBC AUTO-ENTMCNC: 31.7 G/DL (ref 31.4–37.4)
MCV RBC AUTO: 84 FL (ref 82–98)
MONOCYTES # BLD AUTO: 0.74 THOUSAND/ÂΜL (ref 0.17–1.22)
MONOCYTES NFR BLD AUTO: 7 % (ref 4–12)
NEUTROPHILS # BLD AUTO: 8.67 THOUSANDS/ÂΜL (ref 1.85–7.62)
NEUTS SEG NFR BLD AUTO: 86 % (ref 43–75)
NRBC BLD AUTO-RTO: 0 /100 WBCS
PLATELET # BLD AUTO: 207 THOUSANDS/UL (ref 149–390)
PMV BLD AUTO: 9.8 FL (ref 8.9–12.7)
POTASSIUM SERPL-SCNC: 3.9 MMOL/L (ref 3.5–5.3)
RBC # BLD AUTO: 4.68 MILLION/UL (ref 3.81–5.12)
RSV RNA RESP QL NAA+PROBE: NEGATIVE
S PYO DNA THROAT QL NAA+PROBE: NOT DETECTED
SARS-COV-2 RNA RESP QL NAA+PROBE: NEGATIVE
SODIUM SERPL-SCNC: 133 MMOL/L (ref 135–147)
WBC # BLD AUTO: 10.22 THOUSAND/UL (ref 4.31–10.16)

## 2023-08-20 PROCEDURE — 96361 HYDRATE IV INFUSION ADD-ON: CPT

## 2023-08-20 PROCEDURE — 80048 BASIC METABOLIC PNL TOTAL CA: CPT | Performed by: EMERGENCY MEDICINE

## 2023-08-20 PROCEDURE — 99283 EMERGENCY DEPT VISIT LOW MDM: CPT

## 2023-08-20 PROCEDURE — 84703 CHORIONIC GONADOTROPIN ASSAY: CPT | Performed by: EMERGENCY MEDICINE

## 2023-08-20 PROCEDURE — 0241U HB NFCT DS VIR RESP RNA 4 TRGT: CPT | Performed by: EMERGENCY MEDICINE

## 2023-08-20 PROCEDURE — 96375 TX/PRO/DX INJ NEW DRUG ADDON: CPT

## 2023-08-20 PROCEDURE — 87651 STREP A DNA AMP PROBE: CPT | Performed by: EMERGENCY MEDICINE

## 2023-08-20 PROCEDURE — 36415 COLL VENOUS BLD VENIPUNCTURE: CPT | Performed by: EMERGENCY MEDICINE

## 2023-08-20 PROCEDURE — 85025 COMPLETE CBC W/AUTO DIFF WBC: CPT | Performed by: EMERGENCY MEDICINE

## 2023-08-20 PROCEDURE — 96374 THER/PROPH/DIAG INJ IV PUSH: CPT

## 2023-08-20 PROCEDURE — 99284 EMERGENCY DEPT VISIT MOD MDM: CPT | Performed by: EMERGENCY MEDICINE

## 2023-08-20 RX ORDER — ONDANSETRON 4 MG/1
4 TABLET, ORALLY DISINTEGRATING ORAL EVERY 6 HOURS PRN
Qty: 20 TABLET | Refills: 0 | Status: SHIPPED | OUTPATIENT
Start: 2023-08-20

## 2023-08-20 RX ORDER — AMOXICILLIN 500 MG/1
1000 CAPSULE ORAL ONCE
Status: COMPLETED | OUTPATIENT
Start: 2023-08-20 | End: 2023-08-20

## 2023-08-20 RX ORDER — DEXAMETHASONE SODIUM PHOSPHATE 10 MG/ML
6 INJECTION, SOLUTION INTRAMUSCULAR; INTRAVENOUS ONCE
Status: COMPLETED | OUTPATIENT
Start: 2023-08-20 | End: 2023-08-20

## 2023-08-20 RX ORDER — AMOXICILLIN 500 MG/1
1000 CAPSULE ORAL 2 TIMES DAILY
Qty: 20 CAPSULE | Refills: 0 | Status: SHIPPED | OUTPATIENT
Start: 2023-08-20 | End: 2023-08-25

## 2023-08-20 RX ORDER — KETOROLAC TROMETHAMINE 30 MG/ML
15 INJECTION, SOLUTION INTRAMUSCULAR; INTRAVENOUS ONCE
Status: COMPLETED | OUTPATIENT
Start: 2023-08-20 | End: 2023-08-20

## 2023-08-20 RX ORDER — ONDANSETRON 2 MG/ML
4 INJECTION INTRAMUSCULAR; INTRAVENOUS ONCE
Status: COMPLETED | OUTPATIENT
Start: 2023-08-20 | End: 2023-08-20

## 2023-08-20 RX ADMIN — DEXAMETHASONE SODIUM PHOSPHATE 6 MG: 10 INJECTION, SOLUTION INTRAMUSCULAR; INTRAVENOUS at 06:38

## 2023-08-20 RX ADMIN — AMOXICILLIN 1000 MG: 500 CAPSULE ORAL at 07:02

## 2023-08-20 RX ADMIN — SODIUM CHLORIDE 1000 ML: 0.9 INJECTION, SOLUTION INTRAVENOUS at 06:38

## 2023-08-20 RX ADMIN — KETOROLAC TROMETHAMINE 15 MG: 30 INJECTION INTRAMUSCULAR; INTRAVENOUS at 06:38

## 2023-08-20 RX ADMIN — ONDANSETRON 4 MG: 2 INJECTION INTRAMUSCULAR; INTRAVENOUS at 06:38

## 2023-08-20 NOTE — DISCHARGE INSTRUCTIONS
Drink plenty of fluids including gatorade/pedialyte, soup broth, et Sue Mussel  Return if symptoms worsen or if new symptoms develop  Your strep test is negative however false negatives aren't uncommon. The antibiotics for your ear infection will also cover for possible strep throat as well.

## 2023-08-22 NOTE — ED PROVIDER NOTES
History  Chief Complaint   Patient presents with   • Sore Throat     Sore throat and believes she has strep, was around niece who just tested positive   • Earache     Pt also reports tightness in face and right ear pain     Patient is a 50-year-old female with history of anxiety/depression that presents for evaluation of sore throat. Patient says that she has multiple sick contacts at home including people sick with group A strep. She says that she has had a lot of pain with swallowing and believes that she is dehydrated because of this. She is also had some nausea vomiting associate with her symptoms. She denies chest pain or dyspnea. She denies trismus drooling or voice change. Patient also has bilateral ear pain. She has not been take anything for her symptoms other than 1 dose of amoxicillin that her father had leftover. Prior to Admission Medications   Prescriptions Last Dose Informant Patient Reported? Taking?    Carbamide Peroxide (EAR DROPS OT)   Yes No   Sig: Administer into ears   Patient not taking: Reported on 2023   Xulane 150-35 MCG/24HR   Yes No   Sig: apply 1 patch topically every week   azelastine (ASTELIN) 0.1 % nasal spray   No No   Si sprays into each nostril 2 (two) times a day Use in each nostril as directed   fluticasone (FLONASE) 50 mcg/act nasal spray   No No   Si sprays into each nostril daily   Patient not taking: Reported on 2023   oxymetazoline (AFRIN) 0.05 % nasal spray   Yes No   Si sprays by Each Nare route 2 (two) times a day   tobramycin (TOBREX) 0.3 % SOLN   No No   Sig: Administer 1 drop to the right eye every 4 (four) hours while awake      Facility-Administered Medications: None       Past Medical History:   Diagnosis Date   • Anxiety    • Depressed    • Seizure [facility])        Past Surgical History:   Procedure Laterality Date   • ADENOIDECTOMY     • TONSILLECTOMY         Family History   Problem Relation Age of Onset   • Hypertension Mother • No Known Problems Father      I have reviewed and agree with the history as documented. E-Cigarette/Vaping   • E-Cigarette Use Never User      E-Cigarette/Vaping Substances     Social History     Tobacco Use   • Smoking status: Never   • Smokeless tobacco: Never   Vaping Use   • Vaping Use: Never used   Substance Use Topics   • Alcohol use: Yes     Comment: socially   • Drug use: Never       Review of Systems   Constitutional: Negative for fever. HENT: Positive for ear pain and sore throat. Respiratory: Negative for shortness of breath. Cardiovascular: Negative for chest pain. Gastrointestinal: Positive for nausea and vomiting. Negative for abdominal pain. Genitourinary: Negative for dysuria. Musculoskeletal: Negative for back pain. Skin: Negative for rash. Neurological: Negative for light-headedness. Psychiatric/Behavioral: Negative for agitation. All other systems reviewed and are negative. Physical Exam  Physical Exam  Vitals reviewed. Constitutional:       General: She is not in acute distress. Appearance: She is well-developed. HENT:      Head: Normocephalic. Ears:      Comments: Bilateral tympanic membranes with erythema, bulging     Mouth/Throat:      Comments: Erythematous oropharynx without evidence of abscess or exudate  Eyes:      Pupils: Pupils are equal, round, and reactive to light. Cardiovascular:      Rate and Rhythm: Normal rate and regular rhythm. Heart sounds: Normal heart sounds. Pulmonary:      Effort: Pulmonary effort is normal.      Breath sounds: Normal breath sounds. Abdominal:      General: Bowel sounds are normal. There is no distension. Palpations: Abdomen is soft. Tenderness: There is no abdominal tenderness. There is no guarding. Comments: No abdominal tenderness   Musculoskeletal:         General: No tenderness or deformity. Normal range of motion. Cervical back: Normal range of motion and neck supple. Skin:     General: Skin is warm and dry. Capillary Refill: Capillary refill takes less than 2 seconds. Neurological:      Mental Status: She is alert and oriented to person, place, and time. Cranial Nerves: No cranial nerve deficit. Sensory: No sensory deficit. Psychiatric:         Behavior: Behavior normal.         Thought Content:  Thought content normal.         Judgment: Judgment normal.         Vital Signs  ED Triage Vitals   Temperature Pulse Respirations Blood Pressure SpO2   08/20/23 0605 08/20/23 0606 08/20/23 0606 08/20/23 0606 08/20/23 0606   98 °F (36.7 °C) 103 17 99/67 97 %      Temp Source Heart Rate Source Patient Position - Orthostatic VS BP Location FiO2 (%)   08/20/23 0606 08/20/23 0606 08/20/23 0606 08/20/23 0606 --   Tympanic Monitor Lying Left arm       Pain Score       08/20/23 0606       2           Vitals:    08/20/23 0606   BP: 99/67   Pulse: 103   Patient Position - Orthostatic VS: Lying         Visual Acuity      ED Medications  Medications   sodium chloride 0.9 % bolus 1,000 mL (0 mL Intravenous Stopped 8/20/23 0827)   ondansetron (ZOFRAN) injection 4 mg (4 mg Intravenous Given 8/20/23 0638)   ketorolac (TORADOL) injection 15 mg (15 mg Intravenous Given 8/20/23 0638)   dexamethasone (PF) (DECADRON) injection 6 mg (6 mg Intravenous Given 8/20/23 0638)   amoxicillin (AMOXIL) capsule 1,000 mg (1,000 mg Oral Given 8/20/23 0702)       Diagnostic Studies  Results Reviewed     Procedure Component Value Units Date/Time    Strep A PCR [[id number]]  (Normal) Collected: 08/20/23 0622    Lab Status: Final result Specimen: Throat Updated: 08/20/23 0735     STREP A PCR Not Detected    hCG, qualitative pregnancy [[id number]]  (Normal) Collected: 08/20/23 0637    Lab Status: Final result Specimen: Blood from Arm, Right Updated: 08/20/23 0725     Preg, Serum Negative    Basic metabolic panel [[id number]]  (Abnormal) Collected: 08/20/23 0637    Lab Status: Final result Specimen: Blood from Arm, Right Updated: 08/20/23 0718     Sodium 133 mmol/L      Potassium 3.9 mmol/L      Chloride 102 mmol/L      CO2 21 mmol/L      ANION GAP 10 mmol/L      BUN 4 mg/dL      Creatinine 0.92 mg/dL      Glucose 122 mg/dL      Calcium 9.4 mg/dL      eGFR 88 ml/min/1.73sq m     Narrative:      [facility] guidelines for Chronic Kidney Disease (CKD):   •  Stage 1 with normal or high GFR (GFR > 90 mL/min/1.73 square meters)  •  Stage 2 Mild CKD (GFR = 60-89 mL/min/1.73 square meters)  •  Stage 3A Moderate CKD (GFR = 45-59 mL/min/1.73 square meters)  •  Stage 3B Moderate CKD (GFR = 30-44 mL/min/1.73 square meters)  •  Stage 4 Severe CKD (GFR = 15-29 mL/min/1.73 square meters)  •  Stage 5 End Stage CKD (GFR <15 mL/min/1.73 square meters)  Note: GFR calculation is accurate only with a steady state creatinine    FLU/RSV/COVID - if FLU/RSV clinically relevant [[id number]]  (Normal) Collected: 08/20/23 0622    Lab Status: Final result Specimen: Nares from Nose Updated: 08/20/23 0711     SARS-CoV-2 Negative     INFLUENZA A PCR Negative     INFLUENZA B PCR Negative     RSV PCR Negative    Narrative:      FOR PEDIATRIC PATIENTS - copy/paste COVID Guidelines URL to browser: [email or web address]    SARS-CoV-2 assay is a Nucleic Acid Amplification assay intended for the  qualitative detection of nucleic acid from SARS-CoV-2 in nasopharyngeal  swabs. Results are for the presumptive identification of SARS-CoV-2 RNA. Positive results are indicative of infection with SARS-CoV-2, the virus  causing COVID-19, but do not rule out bacterial infection or co-infection  with other viruses. Laboratories within the [facility] and its  territories are required to report all positive results to the appropriate  public health authorities.  Negative results do not preclude SARS-CoV-2  infection and should not be used as the sole basis for treatment or other  patient management decisions. Negative results must be combined with  clinical observations, patient history, and epidemiological information. This test has not been FDA cleared or approved. This test has been authorized by FDA under an Emergency Use Authorization  (EUA). This test is only authorized for the duration of time the  declaration that circumstances exist justifying the authorization of the  emergency use of an in vitro diagnostic tests for detection of SARS-CoV-2  virus and/or diagnosis of COVID-19 infection under section 564(b)(1) of  the Act, 21 U. S.C. 604ZQB-0(X)(7), unless the authorization is terminated  or revoked sooner. The test has been validated but independent review by FDA  and CLIA is pending. Test performed using [employer]: This RT-PCR assay targets N2,  a region unique to SARS-CoV-2. A conserved region in the E-gene was chosen  for pan-Sarbecovirus detection which includes SARS-CoV-2. According to CMS-2020-01-R, this platform meets the definition of high-throughput technology.     CBC and differential [[id number]]  (Abnormal) Collected: 08/20/23 0637    Lab Status: Final result Specimen: Blood from Arm, Right Updated: 08/20/23 0649     WBC 10.22 Thousand/uL      RBC 4.68 Million/uL      Hemoglobin 12.5 g/dL      Hematocrit 39.4 %      MCV 84 fL      MCH 26.7 pg      MCHC 31.7 g/dL      RDW 13.0 %      MPV 9.8 fL      Platelets 073 Thousands/uL      nRBC 0 /100 WBCs      Neutrophils Relative 86 %      Immat GRANS % 0 %      Lymphocytes Relative 7 %      Monocytes Relative 7 %      Eosinophils Relative 0 %      Basophils Relative 0 %      Neutrophils Absolute 8.67 Thousands/µL      Immature Grans Absolute 0.04 Thousand/uL      Lymphocytes Absolute 0.76 Thousands/µL      Monocytes Absolute 0.74 Thousand/µL      Eosinophils Absolute 0.00 Thousand/µL      Basophils Absolute 0.01 Thousands/µL                  No orders to display              Procedures  Procedures         ED Course SBIRT 22yo+    Flowsheet Row Most Recent Value   Initial Alcohol Screen: US AUDIT-C     1. How often do you have a drink containing alcohol? 0 Filed at: 08/20/2023 0608   2. How many drinks containing alcohol do you have on a typical day you are drinking? 0 Filed at: 08/20/2023 9875   3a. Male UNDER 65: How often do you have five or more drinks on one occasion? 0 Filed at: 08/20/2023 0608   3b. FEMALE Any Age, or MALE 65+: How often do you have 4 or more drinks on one occassion? 0 Filed at: 08/20/2023 3073   Audit-C Score 0 Filed at: 08/20/2023 5979   SONALI: How many times in the past year have you. .. Used an illegal drug or used a prescription medication for non-medical reasons? Never Filed at: 08/20/2023 6553                    Medical Decision Making  Patient is a 25-year-old female who presents for evaluation of sore throat and ear pain nausea and vomiting. Seems to have otitis media based on exam.  We will also swab for strep because of sick contacts at home. Blood work pending and signed out to Dr. Dunia Castano. IV fluids and symptomatic management initiated. Patient in agreement the plan at the time of signout. Amount and/or Complexity of Data Reviewed  Labs: ordered. Risk  Prescription drug management.           Disposition  Final diagnoses:   Otitis media   Pharyngitis   Nausea and vomiting   Hyponatremia   Dehydration     Time reflects when diagnosis was documented in both MDM as applicable and the Disposition within this note     Time User Action Codes Description Comment    8/20/2023  6:50 AM Awa Tyra Add [H66.90] Otitis media     8/20/2023  6:50 AM Awa Tyra Add [J02.9] Pharyngitis     8/20/2023  6:52 AM Carl Alcala Add [R11.2] Nausea and vomiting     8/20/2023  8:22 AM Blima Igor Add [E87.1] Hyponatremia     8/20/2023  8:22 AM Blima Igor Add [E86.0] Dehydration       ED Disposition     ED Disposition   Discharge    Condition Stable    Date/Time   Sun Aug 20, 2023 3446    Comment   Gurdeep Salazar discharge to home/self care. Follow-up Information     Follow up With Specialties Details Why [facility] Emergency Department Emergency Medicine  If symptoms worsen [address] [place] [id number]  [address] [place] [address] Emergency Department, [address] [facility]ILIATED WITH [facility] [address] [facility]          Discharge Medication List as of 8/20/2023  7:38 AM      START taking these medications    Details   amoxicillin (AMOXIL) 500 mg capsule Take 2 capsules (1,000 mg total) by mouth 2 (two) times a day for 5 days, Starting Sun 8/20/2023, Until Fri 8/25/2023, Normal      ondansetron (Zofran ODT) 4 mg disintegrating tablet Take 1 tablet (4 mg total) by mouth every 6 (six) hours as needed for nausea or vomiting, Starting Sun 8/20/2023, Normal         CONTINUE these medications which have NOT CHANGED    Details   azelastine (ASTELIN) 0.1 % nasal spray 2 sprays into each nostril 2 (two) times a day Use in each nostril as directed, Starting Mon 5/1/2023, Normal      Carbamide Peroxide (EAR DROPS OT) Administer into ears, Historical Med      fluticasone (FLONASE) 50 mcg/act nasal spray 2 sprays into each nostril daily, Starting Wed 11/30/2022, Normal      oxymetazoline (AFRIN) 0.05 % nasal spray 2 sprays by Each Nare route 2 (two) times a day, Historical Med      tobramycin (TOBREX) 0.3 % SOLN Administer 1 drop to the right eye every 4 (four) hours while awake, Starting Fri 8/18/2023, Normal      Xulane 150-35 MCG/24HR apply 1 patch topically every week, Historical Med             No discharge procedures on file.     PDMP Review     None          ED Provider  Electronically Signed by           Love Barger MD  08/22/23 4961

## 2023-08-29 ENCOUNTER — VBI (OUTPATIENT)
Dept: ADMINISTRATIVE | Facility: OTHER | Age: 23
End: 2023-08-29

## 2023-10-03 ENCOUNTER — APPOINTMENT (OUTPATIENT)
Dept: RADIOLOGY | Facility: CLINIC | Age: 23
End: 2023-10-03
Payer: COMMERCIAL

## 2023-10-03 ENCOUNTER — OFFICE VISIT (OUTPATIENT)
Dept: FAMILY MEDICINE CLINIC | Facility: CLINIC | Age: 23
End: 2023-10-03
Payer: COMMERCIAL

## 2023-10-03 VITALS
DIASTOLIC BLOOD PRESSURE: 66 MMHG | OXYGEN SATURATION: 98 % | TEMPERATURE: 98.1 F | BODY MASS INDEX: 27.05 KG/M2 | HEIGHT: 62 IN | HEART RATE: 83 BPM | WEIGHT: 147 LBS | SYSTOLIC BLOOD PRESSURE: 116 MMHG

## 2023-10-03 DIAGNOSIS — M25.562 LEFT ANTERIOR KNEE PAIN: Primary | ICD-10-CM

## 2023-10-03 DIAGNOSIS — M25.562 LEFT ANTERIOR KNEE PAIN: ICD-10-CM

## 2023-10-03 PROCEDURE — 73562 X-RAY EXAM OF KNEE 3: CPT

## 2023-10-03 PROCEDURE — 99213 OFFICE O/P EST LOW 20 MIN: CPT | Performed by: NURSE PRACTITIONER

## 2023-10-03 NOTE — PROGRESS NOTES
[facility]        NAME: Reece Lawton is a 26 y.o. female  : 2000    MRN: [id number]  DATE: October 3, 2023  TIME: 3:56 PM    Assessment and Plan   Left anterior knee pain [M25.562]  1. Left anterior knee pain  XR knee 3 vw left non injury    Ambulatory Referral to Physical Therapy    Ambulatory Referral to Sports Medicine        1. Left anterior knee pain  Xray as pain has been present for 2 months. Follow up with orthopedics. - XR knee 3 vw left non injury; Future  - Ambulatory Referral to Physical Therapy; Future  - Ambulatory Referral to Sports Medicine; Future      Patient Instructions     Patient Instructions   Ibuprofen 600 mg 3 times a day. Ice  Elevation  Ace wrap and/or brace. Chief Complaint     Chief Complaint   Patient presents with   • Knee Pain     Left knee pain, 2 months. Went on hike this weekend - now has shooting pain from knee to ankle since. History of Present Illness       Patient here for worsening left knee pain since going on a longer hike. Has been elevating, ice and ibuprofen. Denies any trauma, falls or twisting injuries. Pain started about 2 months ago and has not resolved. Hike was only 2 miles. Feels like leg wants to give out. Review of Systems   Review of Systems   Constitutional: Negative. Negative for fatigue and fever. Respiratory: Negative. Negative for shortness of breath and wheezing. Cardiovascular: Negative. Negative for chest pain and palpitations. Skin: Negative. Negative for rash. Neurological: Negative. Negative for dizziness, light-headedness and headaches. Psychiatric/Behavioral: Negative. Negative for dysphoric mood. The patient is not nervous/anxious.         PHQ-2/9 Depression Screening    Little interest or pleasure in doing things: 0 - not at all  Feeling down, depressed, or hopeless: 0 - not at all  Trouble falling or staying asleep, or sleeping too much: 0 - not at all  Feeling tired or having little energy: 0 - not at all  Poor appetite or overeatin - not at all  Feeling bad about yourself - or that you are a failure or have let yourself or your family down: 0 - not at all  Trouble concentrating on things, such as reading the newspaper or watching television: 0 - not at all  Moving or speaking so slowly that other people could have noticed. Or the opposite - being so fidgety or restless that you have been moving around a lot more than usual: 0 - not at all  Thoughts that you would be better off dead, or of hurting yourself in some way: 0 - not at all  PHQ-9 Score: 0   PHQ-9 Interpretation: No or Minimal depression         Current Medications       Current Outpatient Medications:   •  Xulane 150-35 MCG/24HR, apply 1 patch topically every week, Disp: , Rfl:     Current Allergies     Allergies as of 10/03/2023   • (No Known Allergies)            The following portions of the patient's history were reviewed and updated as appropriate: allergies, current medications, past family history, past medical history, past social history, past surgical history and problem list.     Past Medical History:   Diagnosis Date   • Anxiety    • Depressed    • Seizure ([address])        Past Surgical History:   Procedure Laterality Date   • ADENOIDECTOMY     • TONSILLECTOMY         Family History   Problem Relation Age of Onset   • Hypertension Mother    • No Known Problems Father          Medications have been verified. Objective   /66   Pulse 83   Temp 98.1 °F (36.7 °C)   Ht 5' 2" (1.575 m)   Wt 66.7 kg (147 lb)   SpO2 98%   BMI 26.89 kg/m²        Physical Exam     Physical Exam  Vitals and nursing note reviewed. Constitutional:       General: She is not in acute distress. Appearance: She is well-developed. She is not ill-appearing. HENT:      Head: Normocephalic and atraumatic. Neck:      Trachea: No tracheal deviation.    Pulmonary:      Effort: Pulmonary effort is normal. No tachypnea, accessory muscle usage or respiratory distress. Breath sounds: No stridor. Musculoskeletal:         General: Swelling present. No tenderness or deformity. Cervical back: No rigidity. Left knee: Swelling present. No erythema or ecchymosis. Normal range of motion. Neurological:      Mental Status: She is alert and oriented to person, place, and time. Psychiatric:         Speech: Speech normal.         Behavior: Behavior normal. Behavior is cooperative.

## 2023-10-07 ENCOUNTER — OFFICE VISIT (OUTPATIENT)
Dept: OBGYN CLINIC | Facility: CLINIC | Age: 23
End: 2023-10-07
Payer: COMMERCIAL

## 2023-10-07 VITALS
SYSTOLIC BLOOD PRESSURE: 98 MMHG | WEIGHT: 146.6 LBS | TEMPERATURE: 98.7 F | HEART RATE: 61 BPM | BODY MASS INDEX: 26.98 KG/M2 | HEIGHT: 62 IN | DIASTOLIC BLOOD PRESSURE: 59 MMHG

## 2023-10-07 DIAGNOSIS — S83.207A MCMURRAY TEST POSITIVE, LEFT, INITIAL ENCOUNTER: ICD-10-CM

## 2023-10-07 DIAGNOSIS — M76.822 POSTERIOR TIBIAL TENDINITIS OF LEFT LOWER EXTREMITY: ICD-10-CM

## 2023-10-07 DIAGNOSIS — S89.92XA KNEE INJURY, LEFT, INITIAL ENCOUNTER: Primary | ICD-10-CM

## 2023-10-07 PROCEDURE — 99204 OFFICE O/P NEW MOD 45 MIN: CPT | Performed by: FAMILY MEDICINE

## 2023-10-07 NOTE — PROGRESS NOTES
[facility] [address] [address] [phone]      Assessment:  1. Knee injury, left, initial encounter  -     Ambulatory Referral to Sports Medicine  -     MRI knee left  wo contrast; Future; Expected date: 10/07/2023  -     Brace    2. Trav test positive, left, initial encounter  -     MRI knee left  wo contrast; Future; Expected date: 10/07/2023  -     Brace    3. Posterior tibial tendinitis of left lower extremity  -     Ambulatory Referral to Physical Therapy; Future        Plan:  Patient Instructions   F/u after MRI  MRI L knee-  L knee pain/injury/pos trav/ XR neg/r/o stress fx vs meniscal tear  Icing/heat/OTC pain meds as needed. Knee brace. Begin therapy for L ankle     Return for f/u after MRI, Recheck. Chief Complaint:  Chief Complaint   Patient presents with   • Left Knee - Pain       Subjective:   HPI    Patient ID: Rosario Perdomo is a 26 y.o. female     Here c/o L knee pain  Pain started about 2 months ago  Seen by PCP. XR done. Started running 2 months ago- just started hurting after 1 wk  Felt like she took a wrong step- pain after that  Last week walking up steps and felt knee give out/shooting pain down to ankle. Constant/sharp pain  Ibuprofen  Pressure pain  Swelling/locking/giving out  Pain for 2 wks    Also L ankle pain  Pain walking    Review of Systems   Constitutional: Negative for fatigue and fever. Respiratory: Negative for shortness of breath. Cardiovascular: Negative for chest pain. Gastrointestinal: Negative for abdominal pain and nausea. Genitourinary: Negative for dysuria. Musculoskeletal: Positive for arthralgias. Skin: Negative for rash and wound. Neurological: Negative for weakness and headaches.        Objective:  Vitals:  BP 98/59 (BP Location: Left arm, Patient Position: Sitting, Cuff Size: Standard)   Pulse 61   Temp 98.7 °F (37.1 °C) (Tympanic)   Ht 5' 2" (1.575 m)   Wt 66.5 kg (146 lb 9.6 oz)   BMI 26.81 kg/m²     The following portions of the patient's history were reviewed and updated as appropriate: allergies, current medications, past family history, past medical history, past social history, past surgical history, and problem list.    Physical exam:  Physical Exam  Constitutional:       Appearance: Normal appearance. She is normal weight. HENT:      Head: Normocephalic. Eyes:      Extraocular Movements: Extraocular movements intact. Pulmonary:      Effort: Pulmonary effort is normal.   Musculoskeletal:         General: Tenderness present. Cervical back: Normal range of motion. Left knee: No effusion. Instability Tests: Medial Brody test positive and lateral Brody test positive. Comments: L posterior tendon TTP  Pain with eversion   Skin:     General: Skin is warm and dry. Neurological:      General: No focal deficit present. Mental Status: She is alert and oriented to person, place, and time. Mental status is at baseline. Psychiatric:         Mood and Affect: Mood normal.         Behavior: Behavior normal.         Thought Content: Thought content normal.         Judgment: Judgment normal.       Left Knee Exam     Tenderness   The patient is experiencing tenderness in the lateral joint line (distal quad tendont TTP). Range of Motion   The patient has normal left knee ROM. Tests   Brody:  Medial - positive Lateral - positive  Varus: negative Valgus: negative    Other   Swelling: none  Effusion: no effusion present          Observations   Left Knee   Negative for effusion. I have personally reviewed pertinent films in PACS and my interpretation is XR- L knee- nml study.       Art Cantu MD

## 2023-10-07 NOTE — LETTER
October 7, 2023     Shayla Saldana  [address] [facility]    Patient: Thomas Salazar   YOB: 2000   Date of Visit: 10/7/2023       Dear Dr. Eleonora Trejo:    Thank you for referring Roslyn Barclay to me for evaluation. Below are my notes for this consultation. If you have questions, please do not hesitate to call me. I look forward to following your patient along with you. Sincerely,        Melani Mcguire MD        CC: No Recipients    Melani Mcguire MD  10/7/2023  9:48 AM  Incomplete  [facility] [place] [address]  [address] [facility] [address] [place] [address] [phone]      Assessment:  1. Knee injury, left, initial encounter  -     Ambulatory Referral to Sports Medicine  -     MRI knee left  wo contrast; Future; Expected date: 10/07/2023  -     Brace    2. Brody test positive, left, initial encounter  -     MRI knee left  wo contrast; Future; Expected date: 10/07/2023  -     Brace    3. Posterior tibial tendinitis of left lower extremity  -     Ambulatory Referral to Physical Therapy; Future        Plan:  Patient Instructions   F/u after MRI  MRI L knee-  L knee pain/injury/pos brody/ XR neg/r/o stress fx vs meniscal tear  Icing/heat/OTC pain meds as needed. Knee brace. Begin therapy for L ankle     Return for f/u after MRI, Recheck. Chief Complaint:  Chief Complaint   Patient presents with   • Left Knee - Pain       Subjective:   HPI    Patient ID: Morris Dunn is a 26 y.o. female     Here c/o L knee pain  Pain started about 2 months ago  Seen by PCP. XR done. Started running 2 months ago- just started hurting after 1 wk  Felt like she took a wrong step- pain after that  Last week walking up steps and felt knee give out/shooting pain down to ankle.   Constant/sharp pain  Ibuprofen  Pressure pain  Swelling/locking/giving out  Pain for 2 wks    Also L ankle pain  Pain walking    Review of Systems   Constitutional: Negative for fatigue and fever. Respiratory: Negative for shortness of breath. Cardiovascular: Negative for chest pain. Gastrointestinal: Negative for abdominal pain and nausea. Genitourinary: Negative for dysuria. Musculoskeletal: Positive for arthralgias. Skin: Negative for rash and wound. Neurological: Negative for weakness and headaches. Objective:  Vitals:  BP 98/59 (BP Location: Left arm, Patient Position: Sitting, Cuff Size: Standard)   Pulse 61   Temp 98.7 °F (37.1 °C) (Tympanic)   Ht 5' 2" (1.575 m)   Wt 66.5 kg (146 lb 9.6 oz)   BMI 26.81 kg/m²     The following portions of the patient's history were reviewed and updated as appropriate: allergies, current medications, past family history, past medical history, past social history, past surgical history, and problem list.    Physical exam:  Physical Exam  Constitutional:       Appearance: Normal appearance. She is normal weight. HENT:      Head: Normocephalic. Eyes:      Extraocular Movements: Extraocular movements intact. Pulmonary:      Effort: Pulmonary effort is normal.   Musculoskeletal:         General: Tenderness present. Cervical back: Normal range of motion. Left knee: No effusion. Instability Tests: Medial Brody test positive and lateral Brody test positive. Comments: L posterior tendon TTP  Pain with eversion   Skin:     General: Skin is warm and dry. Neurological:      General: No focal deficit present. Mental Status: She is alert and oriented to person, place, and time. Mental status is at baseline. Psychiatric:         Mood and Affect: Mood normal.         Behavior: Behavior normal.         Thought Content: Thought content normal.         Judgment: Judgment normal.       Left Knee Exam     Tenderness   The patient is experiencing tenderness in the lateral joint line (distal quad tendont TTP).     Range of Motion   The patient has normal left knee ROM.    Tests   Brody:  Medial - positive Lateral - positive  Varus: negative Valgus: negative    Other   Swelling: none  Effusion: no effusion present          Observations   Left Knee   Negative for effusion. I have personally reviewed pertinent films in PACS and my interpretation is XR- L knee- nml study.       Shayna Ashley MD

## 2023-10-07 NOTE — PATIENT INSTRUCTIONS
F/u after MRI  MRI L knee-  L knee pain/injury/pos trav/ XR neg/r/o stress fx vs meniscal tear  Icing/heat/OTC pain meds as needed. Knee brace.   Begin therapy for L ankle

## 2023-10-13 ENCOUNTER — HOSPITAL ENCOUNTER (OUTPATIENT)
Dept: MRI IMAGING | Facility: HOSPITAL | Age: 23
End: 2023-10-13
Attending: FAMILY MEDICINE
Payer: COMMERCIAL

## 2023-10-13 DIAGNOSIS — S89.92XA KNEE INJURY, LEFT, INITIAL ENCOUNTER: ICD-10-CM

## 2023-10-13 DIAGNOSIS — S83.207A MCMURRAY TEST POSITIVE, LEFT, INITIAL ENCOUNTER: ICD-10-CM

## 2023-10-13 PROCEDURE — 73721 MRI JNT OF LWR EXTRE W/O DYE: CPT

## 2023-10-13 PROCEDURE — G1004 CDSM NDSC: HCPCS

## 2023-10-15 NOTE — PROGRESS NOTES
PT Evaluation     Today's date: 10/16/2023  Patient name: Juan C Salazar  : 2000  MRN: [id number]  Referring provider: Ke Rojo MD  Dx:   Encounter Diagnosis     ICD-10-CM    1. Posterior tibial tendinitis of left lower extremity  Y43.466                      Assessment  Assessment details: The patient is a 25 y/o female who presents to OPPT with diagnosis of L posterior tibial tendonitis. She has complaints of constant pain along her L medial ankle, increased pain in [address] position. She also is currently being seen by Dr. Ebenezer Kwong for her L knee and is waiting the results of her MRI. She demonstrates deficits with decreased A/PROM and strength, decreased flexibility, decreased balance and proprioception, mild edema, difficulty with stair negotiation and pain with completing her ADLs and tasks at home. She is I with all tasks but has pain with doing them. More pain noted with increased time on her feet. She is currently using hinged Yolanda Balsam knee brace, spoke with patient about possible use of ace wrap or compression sleeve for her ankle (she did not want a CAM boot at this time). She ambulates with slow dulce and decreased weight shift to LLE in stance phase. She has difficulty with stair negotiation and has been going up and down the steps with non-reciprocal gait pattern. TTP is noted along posterior tibial tendon. Secondary to pain and above deficits she is limited with her overall mobility and function. The patient would benefit from continued PT to address deficits and improve function. Tx to include ROM, stretching, strengthening, modalities, HEP, pt education, postural ed, lifting/body mechanics, neuro re-ed, balance/proprioception Te, MT and equipment.       Impairments: abnormal gait, abnormal or restricted ROM, activity intolerance, impaired balance, impaired physical strength, lacks appropriate home exercise program, pain with function and weight-bearing intolerance  Other impairment: decreased flexibility  Functional limitations: difficulty with stair negotiationUnderstanding of Dx/Px/POC: good   Prognosis: good    Goals  STGs:  1. Initiate and complete HEP with verbal cues. 2.  Improve L ankle ROM by 5-10 degrees in 4 weeks. 3.  Improve L ankle strength by 1/2-1 grade in 4 weeks. 4.  Decrease L ankle pain by > 25% in 4 weeks. LTGs:  1. Patient to be I with HEP in 8 weeks. 2.  Improve L ankle ROM to WNL t/o in 8 weeks to improve function. 3.  Improve L ankle strength to 5/5 t/o in 8 weeks to improve function. 4.  Decrease L ankle pain to < or = to 2-3/10 with activity in 8 weeks to improve function. 5.  Patient to ambulate with normalized gait pattern in 8 weeks. 6.  Stair negotiation is improved to PLOF in 8 weeks. 7.  Recreational performance is improved to PLOF in 8 weeks. 8.  ADL performance is improved to PLOF in 8 weeks. 9.  Work performance is improved to maximal level of function in 8 weeks. Plan  Plan details: Modalities and therapy interventions prn. Patient would benefit from: skilled physical therapy  Planned modality interventions: cryotherapy and thermotherapy: hydrocollator packs  Planned therapy interventions: IASTM, joint mobilization, manual therapy, balance, balance/weight bearing training, neuromuscular re-education, patient education, postural training, self care, flexibility, functional ROM exercises, strengthening, stretching, therapeutic activities, therapeutic exercise, home exercise program and gait training  Frequency: 2x week  Duration in weeks: 8  Plan of Care beginning date: 10/16/2023  Plan of Care expiration date: 12/11/2023  Treatment plan discussed with: patient        Subjective Evaluation    History of Present Illness  Mechanism of injury: The patient states that about 2 months ago she started walking outside and then started to run outside. She notes that while running she felt that her L knee had "caved in".   She had gone to see her PCP for a physical, she told her about her pain. She was told it was probably bruised and to come back if it got worse. Then on 10/2 when she was going up the steps she felt like her knee hyperextended and she increased pain along with shooting pain down her leg to her ankle. She had gone back to see her PCP on 10/3. She had x-rays taken on her knee, no imaging on her ankle. She was referred to the orthopedic doctor. She had seen Dr. Mary Edmond on 10/7. She had an MRI completed of her knee this past Friday (did not get results yet). She was referred to OPPT and she now presents for her evaluation. She will be going back to see the doctor after she gets the results of her MRI for her follow up appointment. She was given a hinged Joshua Schwab knee  brace to wear during the day. She notes he had also to give her a CAM boot, but she wanted to see how therapy goes first.    From EMR review of MRI from 10/13: IMPRESSION:  Mild bone marrow edema/contusion in the posterior aspects of the lateral tibial plateau and lateral femoral condyle. No discrete fracture. Intact menisci and ligaments. The patient states that her pain is constant along her LLE from her thigh to her ankle. Most ankle pain is along her inner ankle. Pain is constant but more when on her feet. She denies any N&T into L foot. She denies any swelling in her ankle. She notes that pain can wake her up overnight, has difficulty sleeping.           Patient Goals  Patient goals for therapy: increased motion, improved balance, decreased pain, increased strength and return to sport/leisure activities  Patient goal: "To have less pain in my ankle, to get back into working out."  Pain  At best pain rating: 3  At worst pain rating: 10  Location: L Ankle - medial ankle  Quality: dull ache, sharp and discomfort  Relieving factors: rest, heat and ice  Aggravating factors: stair climbing (WBing activities)    Social Support  Steps to enter house: no  Stairs in house: yes   Lives in: multiple-level home  Lives with: parents    Employment status: working (Full time teacher - stands most of day)        Objective     Palpation   Left   Tenderness of the posterior tibialis. Tenderness   Left Ankle/Foot   Tenderness in the medial malleolus and posterior tibial tendon. No tenderness in the anterior ankle and lateral malleolus. Neurological Testing     Sensation     Ankle/Foot   Left Ankle/Foot   Intact: light touch    Right Ankle/Foot   Intact: light touch     Active Range of Motion   Left Ankle/Foot   Dorsiflexion (ke): -10 degrees with pain  Plantar flexion: 50 degrees with pain  Inversion: 30 degrees with pain  Eversion: 12 degrees with pain    Right Ankle/Foot   Dorsiflexion (ke): 0 degrees   Plantar flexion: 70 degrees   Inversion: 40 degrees   Eversion: 14 degrees     Passive Range of Motion   Left Ankle/Foot    Dorsiflexion (ke): -8 degrees with pain  Plantar flexion: 54 degrees with pain  Inversion: 32 degrees with pain    Strength/Myotome Testing     Left Ankle/Foot   Dorsiflexion: 3-  Plantar flexion: 3-  Inversion: 3-  Eversion: 3-    Right Ankle/Foot   Normal strength    Swelling   Left Ankle/Foot   Metatarsal heads: 20.3 cm  Figure 8: 53.5 cm  Malleoli: 23 cm    Right Ankle/Foot   Metatarsal heads: 19.7 cm  Figure 8: 53 cm  Malleoli: 23 cm    Ambulation   Weight-Bearing Status   Assistive device used: none    Additional Weight-Bearing Status Details  Using Twan Ganser hinged knee brace on L knee during the day. Ambulation: Stairs   Ascend stairs: independent  Pattern: non-reciprocal  Descend stairs: independent  Pattern: non-reciprocal    Observational Gait   Gait: antalgic   Decreased walking speed and left stance time.                  Precautions: Anxiety, Depression, Seizures       Manuals 10/16       L Ankle - gentle                                Neuro Re-Ed         BHARATH Rodriguez        SLS        Tandem Stance        Sidestepping w/TBand        [employer] Board                Ther Ex        NuStep vs Bike        HR/TR Seated HEP       Calf Stretch with wedge        Ankle TBand        Ankle Isometrics        Towel Inv/Evr        Arch Lifts        Ankle ROM HEP       Ankle Circles HEP                               Ther Activity        Stepups F/L        Stepdowns        Gait Training                        Modalities        CP prn                          Access Code: [email or web address]  URL: [email or web address]  Date: 10/16/2023  Prepared by: Jordan     Exercises  - Seated Heel Raise  - 1 x daily - 7 x weekly - 1 sets - 10 reps  - Seated Toe Raise  - 1 x daily - 7 x weekly - 1 sets - 10 reps  - Supine Ankle Pumps  - 1 x daily - 7 x weekly - 1 sets - 10 reps  - Supine Ankle Inversion Eversion AROM  - 1 x daily - 7 x weekly - 1 sets - 10 reps  - Seated Ankle Circles  - 1 x daily - 7 x weekly - 1 sets - 10 reps

## 2023-10-16 ENCOUNTER — EVALUATION (OUTPATIENT)
Dept: PHYSICAL THERAPY | Facility: CLINIC | Age: 23
End: 2023-10-16
Payer: COMMERCIAL

## 2023-10-16 DIAGNOSIS — M76.822 POSTERIOR TIBIAL TENDINITIS OF LEFT LOWER EXTREMITY: Primary | ICD-10-CM

## 2023-10-16 PROCEDURE — 97162 PT EVAL MOD COMPLEX 30 MIN: CPT | Performed by: PHYSICAL THERAPIST

## 2023-10-16 PROCEDURE — 97535 SELF CARE MNGMENT TRAINING: CPT | Performed by: PHYSICAL THERAPIST

## 2023-10-18 ENCOUNTER — OFFICE VISIT (OUTPATIENT)
Dept: PHYSICAL THERAPY | Facility: CLINIC | Age: 23
End: 2023-10-18
Payer: COMMERCIAL

## 2023-10-18 DIAGNOSIS — M76.822 POSTERIOR TIBIAL TENDINITIS OF LEFT LOWER EXTREMITY: Primary | ICD-10-CM

## 2023-10-18 PROCEDURE — 97140 MANUAL THERAPY 1/> REGIONS: CPT | Performed by: PHYSICAL THERAPIST

## 2023-10-18 PROCEDURE — 97110 THERAPEUTIC EXERCISES: CPT | Performed by: PHYSICAL THERAPIST

## 2023-10-23 ENCOUNTER — OFFICE VISIT (OUTPATIENT)
Dept: PHYSICAL THERAPY | Facility: CLINIC | Age: 23
End: 2023-10-23
Payer: COMMERCIAL

## 2023-10-23 DIAGNOSIS — M76.822 POSTERIOR TIBIAL TENDINITIS OF LEFT LOWER EXTREMITY: Primary | ICD-10-CM

## 2023-10-23 PROCEDURE — 97140 MANUAL THERAPY 1/> REGIONS: CPT | Performed by: PHYSICAL THERAPIST

## 2023-10-23 PROCEDURE — 97112 NEUROMUSCULAR REEDUCATION: CPT | Performed by: PHYSICAL THERAPIST

## 2023-10-23 PROCEDURE — 97110 THERAPEUTIC EXERCISES: CPT | Performed by: PHYSICAL THERAPIST

## 2023-10-23 NOTE — PROGRESS NOTES
Daily Note     Today's date: 10/23/2023  Patient name: Marquis Keisha Salazar  : 2000  MRN: [id number]  Referring provider: Art Cantu MD  Dx:   Encounter Diagnosis     ICD-10-CM    1. Posterior tibial tendinitis of left lower extremity  M76.822           Start Time: 1600  Stop Time: 1645  Total time in clinic (min): 45 minutes    Subjective: Patient received results of MRI, (+) bone contusion. No fracture or sprain. Ankle is starting to feel better. Has been wearing knee brace, but feels restricted with navigating stairs and would like to know if she can remove it. Objective: See treatment diary below      Assessment: Tolerated treatment well. Progressing well towards goals. Included more WB exercises today with results of MRI. No complaints of increased pain post session today. Patient exhibited good technique with therapeutic exercises and would benefit from continued PT      Plan: Continue per plan of care. Precautions: Anxiety, Depression, Seizures  Access Code: [email or web address]        Manuals 10/16 10/18 10/23     L Ankle - gentle  ML KB, grade III-IV mobs, stretching, active release of plantar foot and calf.                               Neuro Re-Ed         [employer] Lunges        SLS        Tandem Stance        Sidestepping w/TBand        Monster Walks        BAPS Board  Front/Back  Left/Right  10x ea Cw, ccw 10x ea     CSMi balance   L1-4     Ther Ex        NuStep vs Bike   Nustep 10 min L2     HR/TR Seated HEP 15x ea  CSMi 50/50 HR 3x10     Calf Stretch with wedge  Stretch with strap 20"x5 Wedge :30x3     Ankle TBand  RTB 15x ea      Ankle Isometrics  NV      Towel Inv/Evr  Seated 10x 1 min     Arch Lifts  10x 10x     Ankle ROM HEP Reviewed HEP      Ankle Circles HEP Reviewed HEP      Towel Scrunches  10x 2 min     squats   CSMi 50/50 10x             Ther Activity        Stepups F/L        Stepdowns        Gait Training                        Modalities        CP prn

## 2023-10-25 ENCOUNTER — APPOINTMENT (OUTPATIENT)
Dept: PHYSICAL THERAPY | Facility: CLINIC | Age: 23
End: 2023-10-25
Payer: COMMERCIAL

## 2023-10-26 ENCOUNTER — OFFICE VISIT (OUTPATIENT)
Dept: OBGYN CLINIC | Facility: CLINIC | Age: 23
End: 2023-10-26
Payer: COMMERCIAL

## 2023-10-26 VITALS
DIASTOLIC BLOOD PRESSURE: 46 MMHG | SYSTOLIC BLOOD PRESSURE: 106 MMHG | HEART RATE: 84 BPM | TEMPERATURE: 98.3 F | HEIGHT: 62 IN | WEIGHT: 147 LBS | BODY MASS INDEX: 27.05 KG/M2

## 2023-10-26 DIAGNOSIS — M76.822 POSTERIOR TIBIAL TENDINITIS OF LEFT LOWER EXTREMITY: ICD-10-CM

## 2023-10-26 DIAGNOSIS — S89.92XD KNEE INJURY, LEFT, SUBSEQUENT ENCOUNTER: Primary | ICD-10-CM

## 2023-10-26 PROCEDURE — 99213 OFFICE O/P EST LOW 20 MIN: CPT | Performed by: FAMILY MEDICINE

## 2023-10-26 NOTE — PROGRESS NOTES
[facility] [address] [id number] [phone]      Assessment:  1. Knee injury, left, subsequent encounter    2. Posterior tibial tendinitis of left lower extremity        Plan:  Patient Instructions   F/u as needed  Finish therapy     Return if symptoms worsen or fail to improve. Chief Complaint:  Chief Complaint   Patient presents with    Left Knee - Follow-up     Follow-up to MRI       Subjective:   HPI    Patient ID: Carina Luong is a 26 y.o. female     Here for f/u L knee pain/L ankle pain/MRI  1) L ankle pain  Better. No pain  Going to PT    2)  L knee pain  Less pain  Didn't wear brace today- no pain  Tylenol PRN  locking       Narrative & Impression   MRI LEFT KNEE     INDICATION:   S89. 92XA: Unspecified injury of left lower leg, initial encounter  S83.207A: Unspecified tear of unspecified meniscus, current injury, left knee, initial encounter. Rule out stress fracture or meniscal tear     COMPARISON: Left knee radiograph 10/3/2023     TECHNIQUE:    Multiplanar/multisequence MR of the left knee was performed. FINDINGS:     SUBCUTANEOUS TISSUES: Normal     JOINT EFFUSION: None. BAKER'S CYST: None. MENISCI: Intact. CRUCIATE LIGAMENTS: Intact. EXTENSOR APPARATUS: Intact. COLLATERAL LIGAMENTS: Intact. ARTICULAR SURFACES: Normal.     BONES: Mild bone marrow edema/contusion in the posterior aspect of the lateral tibial plateau and posterior aspect of lateral femoral condyle (series 5 image 10, series 6 images 12-13). No discrete fracture. MUSCULATURE:  Intact. IMPRESSION:     Mild bone marrow edema/contusion in the posterior aspects of the lateral tibial plateau and lateral femoral condyle. No discrete fracture. Intact menisci and ligaments. Resident: Caryn Thompson, the attending radiologist, have reviewed the images and agree with the final report above. Workstation performed: [id number]          Review of Systems   Constitutional:  Negative for fatigue and fever. Respiratory:  Negative for shortness of breath. Cardiovascular:  Negative for chest pain. Gastrointestinal:  Negative for abdominal pain and nausea. Genitourinary:  Negative for dysuria. Musculoskeletal:  Positive for arthralgias. Skin:  Negative for rash and wound. Neurological:  Negative for weakness and headaches. Objective:  Vitals:  BP (!) 106/46 (BP Location: Left arm, Patient Position: Sitting, Cuff Size: Standard)   Pulse 84   Temp 98.3 °F (36.8 °C) (Tympanic)   Ht 5' 2" (1.575 m)   Wt 66.7 kg (147 lb)   BMI 26.89 kg/m²     The following portions of the patient's history were reviewed and updated as appropriate: allergies, current medications, past family history, past medical history, past social history, past surgical history, and problem list.    Physical exam:  Physical Exam  Constitutional:       Appearance: Normal appearance. She is normal weight. HENT:      Head: Normocephalic. Eyes:      Extraocular Movements: Extraocular movements intact. Pulmonary:      Effort: Pulmonary effort is normal.   Musculoskeletal:      Cervical back: Normal range of motion. Left knee: No effusion. Instability Tests: Medial Brody test negative and lateral Brody test negative. Skin:     General: Skin is warm and dry. Neurological:      General: No focal deficit present. Mental Status: She is alert and oriented to person, place, and time. Mental status is at baseline. Psychiatric:         Mood and Affect: Mood normal.         Behavior: Behavior normal.         Thought Content: Thought content normal.         Judgment: Judgment normal.       Left Ankle Exam     Tenderness   The patient is experiencing no tenderness. Swelling: none    Range of Motion   The patient has normal left ankle ROM.      Muscle Strength   The patient has normal left ankle strength. Left Knee Exam     Tenderness   The patient is experiencing no tenderness. Range of Motion   The patient has normal left knee ROM. Tests   Brody:  Medial - negative Lateral - negative  Varus: negative Valgus: negative    Other   Swelling: none  Effusion: no effusion present          Observations   Left Knee   Negative for effusion. Strength/Myotome Testing     Left Ankle/Foot   Normal strength      I have personally reviewed pertinent films in PACS and my interpretation is Mri L knee-  mild bone marrow edema/contusion posterior aspect ot lateral tibial plateau/lateral femoral condyle.       Matt Jain MD

## 2023-10-30 ENCOUNTER — EVALUATION (OUTPATIENT)
Dept: PHYSICAL THERAPY | Facility: CLINIC | Age: 23
End: 2023-10-30
Payer: COMMERCIAL

## 2023-10-30 DIAGNOSIS — M76.822 POSTERIOR TIBIAL TENDINITIS OF LEFT LOWER EXTREMITY: Primary | ICD-10-CM

## 2023-10-30 PROCEDURE — 97140 MANUAL THERAPY 1/> REGIONS: CPT | Performed by: PHYSICAL THERAPIST

## 2023-10-30 PROCEDURE — 97112 NEUROMUSCULAR REEDUCATION: CPT | Performed by: PHYSICAL THERAPIST

## 2023-10-30 PROCEDURE — 97110 THERAPEUTIC EXERCISES: CPT | Performed by: PHYSICAL THERAPIST

## 2023-10-30 NOTE — PROGRESS NOTES
Daily Note     Today's date: 10/30/2023  Patient name: Thomas Salazar  : 2000  MRN: [id number]  Referring provider: Melani Mcguire MD  Dx:   Encounter Diagnosis     ICD-10-CM    1. Posterior tibial tendinitis of left lower extremity  M76.822           Start Time: 1700  Stop Time: 1800  Total time in clinic (min): 60 minutes    Subjective: Patient f/u with sports medicine who is pleased with progress. No f/u scheduled. Patient states her pain is much better, but feels like she is overusing RLE to compensate still. L knee feels weak also adding to RLE compensation. Objective: See treatment diary below      Assessment: Tolerated treatment well. Good joint mobility at L TC joint. Performed exercises with good mechanics and minimal increases in pain. Patient would benefit from continued PT working on continued LE stability and functional/dynamic balance and strength for return to PLOF. Plan: Continue per plan of care. Progress treatment as tolerated.        Precautions: Anxiety, Depression, Seizures  Access Code: [email or web address]        Manuals 10/16 10/18 10/23 10/30    L Ankle - gentle  ML KB, grade III-IV mobs, stretching, active release of plantar foot and calf.  KB, grade III-IV mobs, stretching, active release of plantar foot                             Neuro Re-Ed         BOSU Lunges        SLS    Steam boat red 10x ea bilat    Tandem Stance        Sidestepping w/TBand        [employer]        BAPS Board  Front/Back  Left/Right  10x ea Cw, ccw 10x ea Cw, ccw 15x ea    CSMi balance   L1-4     Ther Ex        NuStep vs Bike   Nustep 10 min L2 Bike L1 10 min    HR/TR Seated HEP 15x ea  CSMi 50/50 HR 3x10 CSMi 50/50 HR 3x10    Calf Stretch with wedge  Stretch with strap 20"x5 Wedge :30x3     Ankle TBand  RTB 15x ea      Ankle Isometrics  NV      Towel Inv/Evr  Seated 10x 1 min 1 min    Arch Lifts  10x 10x 20x    Ankle ROM HEP Reviewed HEP      Ankle Circles HEP Reviewed HEP      Towel Scrunches 10x 2 min 2 min    squats   CSMi 50/50 10x CSMi 50/50 10x2            Ther Activity        Stepups F/L        Stepdowns        Gait Training                        Modalities        CP prn

## 2023-11-02 ENCOUNTER — OFFICE VISIT (OUTPATIENT)
Dept: PHYSICAL THERAPY | Facility: CLINIC | Age: 23
End: 2023-11-02
Payer: COMMERCIAL

## 2023-11-02 DIAGNOSIS — M76.822 POSTERIOR TIBIAL TENDINITIS OF LEFT LOWER EXTREMITY: Primary | ICD-10-CM

## 2023-11-02 PROCEDURE — 97140 MANUAL THERAPY 1/> REGIONS: CPT | Performed by: PHYSICAL THERAPIST

## 2023-11-02 PROCEDURE — 97110 THERAPEUTIC EXERCISES: CPT | Performed by: PHYSICAL THERAPIST

## 2023-11-02 NOTE — PROGRESS NOTES
Daily Note     Today's date: 2023  Patient name: Alexandra Salazar  : 2000  MRN: [id number]  Referring provider: Genia Reynoso MD  Dx:   Encounter Diagnosis     ICD-10-CM    1. Posterior tibial tendinitis of left lower extremity  M76.822           Start Time: 1615  Stop Time: 1700  Total time in clinic (min): 45 minutes    Subjective: No new complaints. Objective: See treatment diary below      Assessment: Tolerated treatment well. Able to progress exercises today to improve dynamic standing stability. Good foot mobility, slight tightness noted of plantar foot soft tissue. Patient exhibited good technique with therapeutic exercises and would benefit from continued PT      Plan: Continue per plan of care. Progress treatment as tolerated.        Precautions: Anxiety, Depression, Seizures  Access Code: [email or web address]        Manuals 10/16 10/18 10/23 10/30 11/   L Ankle - gentle  ML KB, grade III-IV mobs, stretching, active release of plantar foot and calf.  KB, grade III-IV mobs, stretching, active release of plantar foot  KB, grade III-IV mobs, stretching, active release of plantar foot                            Neuro Re-Ed         BOSU Lunges        SLS    Steam boat red 10x ea bilat Steam boat red 10x ea bilat   Tandem Stance        Sidestepping w/TBand        [employer]        BAPS Board  Front/Back  Left/Right  10x ea Cw, ccw 10x ea Cw, ccw 15x ea    Fitter fwd/lat     With stops 10x ea   CSMi balance   L1-4  L2-5   Ther Ex        NuStep vs Bike   Nustep 10 min L2 Bike L1 10 min Bike L1 10 min   HR/TR Seated HEP 15x ea  CSMi 50/50 HR 3x10 CSMi 50/50 HR 3x10 CSMi 50/50 HR 3x10   Calf Stretch with wedge  Stretch with strap 20"x5 Wedge :30x3  nv   Ankle TBand  RTB 15x ea      Ankle Isometrics  NV      Towel Inv/Evr  Seated 10x 1 min 1 min    Arch Lifts  10x 10x 20x    Ankle ROM HEP Reviewed HEP      Ankle Circles HEP Reviewed HEP      Towel Scrunches  10x 2 min 2 min    squats   CSMi 50/50 10x CSMi 50/50 10x2 CSMi 50/50 10x2           Ther Activity        Stepups F/L     Bosu 10x   Arch side steps     Yellow 2 laps   Stepdowns        Gait Training                        Modalities        CP prn

## 2023-11-06 ENCOUNTER — APPOINTMENT (OUTPATIENT)
Dept: PHYSICAL THERAPY | Facility: CLINIC | Age: 23
End: 2023-11-06
Payer: COMMERCIAL

## 2023-11-09 ENCOUNTER — OFFICE VISIT (OUTPATIENT)
Dept: PHYSICAL THERAPY | Facility: CLINIC | Age: 23
End: 2023-11-09
Payer: COMMERCIAL

## 2023-11-09 DIAGNOSIS — M76.822 POSTERIOR TIBIAL TENDINITIS OF LEFT LOWER EXTREMITY: Primary | ICD-10-CM

## 2023-11-09 PROCEDURE — 97110 THERAPEUTIC EXERCISES: CPT

## 2023-11-09 PROCEDURE — 97140 MANUAL THERAPY 1/> REGIONS: CPT

## 2023-11-09 NOTE — PROGRESS NOTES
Daily Note     Today's date: 2023  Patient name: Raquel Salazar  : 2000  MRN: [id number]  Referring provider: Veronica Padgett MD  Dx:   Encounter Diagnosis     ICD-10-CM    1. Posterior tibial tendinitis of left lower extremity  M76.822           Start Time: 1616  Stop Time: 1654  Total time in clinic (min): 38 minutes    Subjective: Patient denies ankle pain/soreness and offers no new complaints since last session. Objective: See treatment diary below      Assessment: Patient tolerated treatment session well without experiencing any adverse effects. Continued following prescribed POC with gradual progressions made to reps/resistance. Palpated hypomobility of L plantar fascia that improved with DF stretches and active release technique. Provided occasional cuing as she demonstrated compensatory strategies such as inverting ankle /c heel raises. Patient demonstrated good carryover and able to correct form and demonstrated good technique /c VCs. Patient left clinic with an appropriate amount of fatigue and would benefit from continued PT for stretching and strengthening. Plan: Continue per POC. Increase reps/resistance as tolerated.       Precautions: Anxiety, Depression, Seizures  Access Code: [email or web address]        Manuals 10/16 10/18 10/23 10/30 11/2 11/9   L Ankle - gentle  ML KB, grade III-IV mobs, stretching, active release of plantar foot and calf.  KB, grade III-IV mobs, stretching, active release of plantar foot  KB, grade III-IV mobs, stretching, active release of plantar foot  MS stretching, active release of plantar foot                              Neuro Re-Ed          BHARATH Rodriguez         SLS    Steam boat red 10x ea bilat Steam boat red 10x ea bilat Steam boat red 15x ea bilat   Tandem Stance         Sidestepping w/TBand         [employer]         BAPS Board  Front/Back  Left/Right  10x ea Cw, ccw 10x ea Cw, ccw 15x ea     Fitter fwd/lat     With stops 10x ea With stops 15x ea CSMi balance   L1-4  L2-5 L2-5   Ther Ex         NuStep vs Bike   Nustep 10 min L2 Bike L1 10 min Bike L1 10 min Bike L1 10 min   HR/TR Seated HEP 15x ea  CSMi 50/50 HR 3x10 CSMi 50/50 HR 3x10 CSMi 50/50 HR 3x10 CSMi 50/50 HR 3x10   Calf Stretch with wedge  Stretch with strap 20"x5 Wedge :30x3  nv  Wedge 20" x 3   Ankle TBand  RTB 15x ea       Ankle Isometrics  NV       Towel Inv/Evr  Seated 10x 1 min 1 min     Arch Lifts  10x 10x 20x     Ankle ROM HEP Reviewed HEP       Ankle Circles HEP Reviewed HEP       Towel Scrunches  10x 2 min 2 min     squats   CSMi 50/50 10x CSMi 50/50 10x2 CSMi 50/50 10x2 CSMi 50/50 3x10            Ther Activity         Stepups F/L     Bosu 10x Bosu 15x ea   Arch side steps     Yellow 2 laps nv   Stepdowns         Gait Training                           Modalities         CP prn

## 2023-11-13 ENCOUNTER — EVALUATION (OUTPATIENT)
Dept: PHYSICAL THERAPY | Facility: CLINIC | Age: 23
End: 2023-11-13
Payer: COMMERCIAL

## 2023-11-13 DIAGNOSIS — M76.822 POSTERIOR TIBIAL TENDINITIS OF LEFT LOWER EXTREMITY: Primary | ICD-10-CM

## 2023-11-13 PROCEDURE — 97140 MANUAL THERAPY 1/> REGIONS: CPT | Performed by: PHYSICAL THERAPIST

## 2023-11-13 PROCEDURE — 97110 THERAPEUTIC EXERCISES: CPT | Performed by: PHYSICAL THERAPIST

## 2023-11-13 NOTE — PROGRESS NOTES
PT Re-Evaluation     Today's date: 2023  Patient name: Sharyle Loots Fronheiser  : 2000  MRN: [id number]  Referring provider: Denisa Collins MD  Dx:   Encounter Diagnosis     ICD-10-CM    1. Posterior tibial tendinitis of left lower extremity  M76.822           Start Time: 1620  Stop Time: 1650  Total time in clinic (min): 30 minutes  Assessment  Assessment details: The patient is a 27 y/o female who presents to OPPT with diagnosis of L posterior tibial tendonitis. Initial evaluation was performed 10/16 and has had 6 f/u sessions. Findings of examination today show return to baseline measurements with LLE strength, range of motion and stability as well as overall reduction of pain and ability to return to sport/higher level activities. At this time Herb Marcos has met all goals set at initial evaluation and is independent with her home exercise program, able to be discharged from skilled care. Impairments: abnormal gait, abnormal or restricted ROM, activity intolerance, impaired balance, impaired physical strength, lacks appropriate home exercise program, pain with function and weight-bearing intolerance  Other impairment: decreased flexibility  Functional limitations: difficulty with stair negotiationUnderstanding of Dx/Px/POC: good   Prognosis: good    Goals  STGs:  1. Initiate and complete HEP with verbal cues. -MET   2. Improve L ankle ROM by 5-10 degrees in 4 weeks. - MET   3. Improve L ankle strength by 1/2-1 grade in 4 weeks. - MET   4. Decrease L ankle pain by > 25% in 4 weeks. - MET   LTGs:  1. Patient to be I with HEP in 8 weeks. - MET   2. Improve L ankle ROM to WNL t/o in 8 weeks to improve function. - MET   3. Improve L ankle strength to 5/5 t/o in 8 weeks to improve function. - MET   4. Decrease L ankle pain to < or = to 2-3/10 with activity in 8 weeks to improve function. - MET   5.   Patient to ambulate with normalized gait pattern in 8 weeks. - MET 11/13  6. Stair negotiation is improved to PLOF in 8 weeks. - MET 11/13  7. Recreational performance is improved to PLOF in 8 weeks. - MET 11/13  8. ADL performance is improved to PLOF in 8 weeks. - MET 11/13  9. Work performance is improved to maximal level of function in 8 weeks. - MET 11/13        Plan  Plan details: DC TO [facility]  Plan of Care beginning date: 10/16/2023    Treatment plan discussed with: patient        Subjective Evaluation    History of Present Illness  Subjective 11/13: Patient states 95% improvement since the start of PT. Feeling stronger and more stable with decreased pain. Has began exercising again with no increased pain. Has been able to get around pain free without use of bracing. Does feel like left knee is weaker than right especially ascending stairs. Recent f/u with sports medicine where she was released with no f/u sessions unless needed. Mechanism of injury: The patient states that about 2 months ago she started walking outside and then started to run outside. She notes that while running she felt that her L knee had "caved in". She had gone to see her PCP for a physical, she told her about her pain. She was told it was probably bruised and to come back if it got worse. Then on 10/2 when she was going up the steps she felt like her knee hyperextended and she increased pain along with shooting pain down her leg to her ankle. She had gone back to see her PCP on 10/3. She had x-rays taken on her knee, no imaging on her ankle. She was referred to the orthopedic doctor. She had seen Dr. Phoebe Ponce on 10/7. She had an MRI completed of her knee this past Friday (did not get results yet). She was referred to OPPT and she now presents for her evaluation. She will be going back to see the doctor after she gets the results of her MRI for her follow up appointment. She was given a hinged Karen Grit knee  brace to wear during the day.   She notes he had also to give her a CAM boot, but she wanted to see how therapy goes first.    From EMR review of MRI from 10/13: IMPRESSION:  Mild bone marrow edema/contusion in the posterior aspects of the lateral tibial plateau and lateral femoral condyle. No discrete fracture. Intact menisci and ligaments. The patient states that her pain is constant along her LLE from her thigh to her ankle. Most ankle pain is along her inner ankle. Pain is constant but more when on her feet. She denies any N&T into L foot. She denies any swelling in her ankle. She notes that pain can wake her up overnight, has difficulty sleeping. Patient Goals  Patient goals for therapy: increased motion, improved balance, decreased pain, increased strength and return to sport/leisure activities  Patient goal: "To have less pain in my ankle, to get back into working out."  Pain    11/13  At best pain rating: 3  0  At worst pain rating: 10 3  Location: L Ankle - medial ankle  Quality: dull ache, sharp and discomfort  Relieving factors: rest, heat and ice  Aggravating factors: stair climbing ([facility])    Social Support  Steps to enter house: no  Stairs in house: yes   Lives in: multiple-level home  Lives with: parents    Employment status: working (Full time teacher - stands most of day)        Objective     Palpation   Left   Tenderness of the posterior tibialis. 11/13: no TTP    Tenderness   Left Ankle/Foot   Tenderness in the medial malleolus and posterior tibial tendon. No tenderness in the anterior ankle and lateral malleolus.   11/13: No TTP     Neurological Testing     Sensation     Ankle/Foot   Left Ankle/Foot   Intact: light touch    Right Ankle/Foot   Intact: light touch     Active Range of Motion     Left Ankle/Foot     11/13  Dorsiflexion (ke): -10 degrees with pain 10  Plantar flexion: 50 degrees with pain  70  Inversion: 30 degrees with pain  45  Eversion: 12 degrees with pain  18    Right Ankle/Foot   Dorsiflexion (ke): 0 degrees Plantar flexion: 70 degrees   Inversion: 40 degrees   Eversion: 14 degrees     Passive Range of Motion   Left Ankle/Foot       Dorsiflexion (ke): -8 degrees with pain   Plantar flexion: 54 degrees with pain  Inversion: 32 degrees with pain    Strength/Myotome Testing     Left Ankle/Foot  11/13  Dorsiflexion: 3- 5  Plantar flexion: 3- 5  Inversion: 3-  5  Eversion: 3-  5    Right Ankle/Foot   Normal strength    Swelling   Left Ankle/Foot   Metatarsal heads: 20.3 cm  Figure 8: 53.5 cm  11/13: WNL  Malleoli: 23 cm    Right Ankle/Foot   Metatarsal heads: 19.7 cm  Figure 8: 53 cm  Malleoli: 23 cm    Ambulation   Weight-Bearing Status   Assistive device used: none    Additional Weight-Bearing Status Details  Using [employer] hinged knee brace on L knee during the day. 11/13: DC knee brace, walking without AD or bracing    Ambulation: Stairs   Ascend stairs: independent  Pattern: non-reciprocal  11/13: WNL, reciprocal  Descend stairs: independent  Pattern: non-reciprocal  11/13: WNL, reciprocal    Observational Gait   Gait: antalgic   Decreased walking speed and left stance time. 11/13: baseline      Tests:  11/13: SLB  EO R=30 sec L= 30 sec  EC R= 22 sec L= 23 sec    11/13: SL HR = 100% of contralateral limb       Precautions: Anxiety, Depression, Seizures  Access Code: [email or web address]        Manuals 11/13 10/18 10/23 10/30 11/2 11/9   L Ankle - gentle KB, grade III-IV mobs, stretching, active release of plantar foot and calf.   ML KB, grade III-IV mobs, stretching, active release of plantar foot and calf.  KB, grade III-IV mobs, stretching, active release of plantar foot  KB, grade III-IV mobs, stretching, active release of plantar foot  MS stretching, active release of plantar foot                              Neuro Re-Ed          BOSU Lunges         SLS    Steam boat red 10x ea bilat Steam boat red 10x ea bilat Steam boat red 15x ea bilat   Tandem Stance         Sidestepping w/TBand         CSX [employer] Front/Back  Left/Right  10x ea Cw, ccw 10x ea Cw, ccw 15x ea     Fitter fwd/lat     With stops 10x ea With stops 15x ea   CSMi balance   L1-4  L2-5 L2-5   Ther Ex HEP review        NuStep vs Bike Bike L1 10 min  Nustep 10 min L2 Bike L1 10 min Bike L1 10 min Bike L1 10 min   HR/TR  15x ea  CSMi 50/50 HR 3x10 CSMi 50/50 HR 3x10 CSMi 50/50 HR 3x10 CSMi 50/50 HR 3x10   Calf Stretch with wedge  Stretch with strap 20"x5 Wedge :30x3  nv  Wedge 20" x 3   Ankle TBand  RTB 15x ea       Ankle Isometrics  NV       Towel Inv/Evr  Seated 10x 1 min 1 min     Arch Lifts  10x 10x 20x     Ankle ROM  Reviewed HEP       Ankle Circles  Reviewed HEP       Towel Scrunches  10x 2 min 2 min     squats   CSMi 50/50 10x CSMi 50/50 10x2 CSMi 50/50 10x2 CSMi 50/50 3x10            Ther Activity         Stepups F/L     Bosu 10x Bosu 15x ea   Arch side steps     Yellow 2 laps nv   Stepdowns         Gait Training                           Modalities         CP prn

## 2023-11-16 ENCOUNTER — APPOINTMENT (OUTPATIENT)
Dept: PHYSICAL THERAPY | Facility: CLINIC | Age: 23
End: 2023-11-16
Payer: COMMERCIAL

## 2024-01-31 ENCOUNTER — OFFICE VISIT (OUTPATIENT)
Dept: URGENT CARE | Facility: CLINIC | Age: 24
End: 2024-01-31
Payer: COMMERCIAL

## 2024-01-31 VITALS
DIASTOLIC BLOOD PRESSURE: 63 MMHG | RESPIRATION RATE: 18 BRPM | OXYGEN SATURATION: 97 % | WEIGHT: 142 LBS | SYSTOLIC BLOOD PRESSURE: 106 MMHG | HEIGHT: 62 IN | HEART RATE: 110 BPM | BODY MASS INDEX: 26.13 KG/M2 | TEMPERATURE: 101.1 F

## 2024-01-31 DIAGNOSIS — R31.9 URINARY TRACT INFECTION WITH HEMATURIA, SITE UNSPECIFIED: Primary | ICD-10-CM

## 2024-01-31 DIAGNOSIS — N39.0 URINARY TRACT INFECTION WITH HEMATURIA, SITE UNSPECIFIED: Primary | ICD-10-CM

## 2024-01-31 LAB
SL AMB  POCT GLUCOSE, UA: NEGATIVE
SL AMB LEUKOCYTE ESTERASE,UA: ABNORMAL
SL AMB POCT BILIRUBIN,UA: ABNORMAL
SL AMB POCT BLOOD,UA: ABNORMAL
SL AMB POCT CLARITY,UA: ABNORMAL
SL AMB POCT COLOR,UA: YELLOW
SL AMB POCT KETONES,UA: 160
SL AMB POCT NITRITE,UA: NEGATIVE
SL AMB POCT PH,UA: 8.5
SL AMB POCT SPECIFIC GRAVITY,UA: 1.01
SL AMB POCT URINE HCG: NEGATIVE
SL AMB POCT URINE PROTEIN: 30
SL AMB POCT UROBILINOGEN: 1

## 2024-01-31 PROCEDURE — 87186 SC STD MICRODIL/AGAR DIL: CPT | Performed by: PHYSICIAN ASSISTANT

## 2024-01-31 PROCEDURE — 87077 CULTURE AEROBIC IDENTIFY: CPT | Performed by: PHYSICIAN ASSISTANT

## 2024-01-31 PROCEDURE — 81002 URINALYSIS NONAUTO W/O SCOPE: CPT | Performed by: PHYSICIAN ASSISTANT

## 2024-01-31 PROCEDURE — 81025 URINE PREGNANCY TEST: CPT | Performed by: PHYSICIAN ASSISTANT

## 2024-01-31 PROCEDURE — 87086 URINE CULTURE/COLONY COUNT: CPT | Performed by: PHYSICIAN ASSISTANT

## 2024-01-31 PROCEDURE — 99213 OFFICE O/P EST LOW 20 MIN: CPT | Performed by: PHYSICIAN ASSISTANT

## 2024-01-31 RX ORDER — SULFAMETHOXAZOLE AND TRIMETHOPRIM 800; 160 MG/1; MG/1
1 TABLET ORAL EVERY 12 HOURS SCHEDULED
Qty: 14 TABLET | Refills: 0 | Status: SHIPPED | OUTPATIENT
Start: 2024-01-31 | End: 2024-02-07

## 2024-01-31 NOTE — PROGRESS NOTES
[facility]      NAME: Radha Salazar is a 23 y.o. female  : 2000    MRN: [id number]  DATE: 2024  TIME: 12:30 PM    Assessment and Plan   Urinary tract infection with hematuria, site unspecified [N39.0, R31.9]  1. Urinary tract infection with hematuria, site unspecified  POCT urine dip    Urine culture    POCT urine HCG    sulfamethoxazole-trimethoprim (BACTRIM DS) 800-160 mg per tablet          Patient Instructions   Dysuria   AMBULATORY CARE:   Dysuria  is trouble urinating, or pain, burning, or discomfort when you urinate. Dysuria is usually a symptom of another problem, such as a blockage or urinary tract infection.  Common symptoms include the following:   Fever     Cloudy, bad smelling urine     Urge to urinate often but urinating little     Back, side, or abdominal pain     Blood in your urine     Discharge that smells bad     Itching  Seek care immediately if:   You have severe back, side, or abdominal pain.     You have fever and shaking chills.     You vomit several times in a row.  Contact your healthcare provider if:   Your symptoms do not go away, even after treatment.     You have questions or concerns about your condition or care.  Treatment for dysuria  may include medicines to treat a bacterial infection or help decrease bladder spasms.  Manage your dysuria:   Drink more liquids.  Liquids help flush out bacteria that may be causing an infection. Ask your healthcare provider how much liquid to drink each day and which liquids are best for you.     Take sitz baths as directed.  Fill a bathtub with 4 to 6 inches of warm water. You may also use a sitz bath pan that fits over a toilet. Sit in the sitz bath for 20 minutes. Do this 2 to 3 times a day, or as directed. The warm water can help decrease pain and swelling.   Follow up with your healthcare provider as directed:  Write down your questions so you remember to ask them during your visits.   © 2017 [employer] Information is for End User's use only and may not be sold, redistributed or otherwise used for commercial purposes. All illustrations and images included in CareNotes® are the copyrighted property of [employer] or [employer].  The above information is an  only. It is not intended as medical advice for individual conditions or treatments. Talk to your doctor, nurse or pharmacist before following any medical regimen to see if it is safe and effective for you.    I have prescribed an antibiotic for the infection.  Please take the antibiotic as prescribed and finish the entire prescription.  I recommend that the patient takes an over the counter probiotic or eats yogurt with live cultures in it (activia) to keep good bacteria in the gut and help prevent diarrhea.  If not improving over the next 3-5 days, follow up with PCP.    To present to the ER if symptoms worsen.  Chief Complaint     Chief Complaint   Patient presents with    Possible UTI      Patient reports feeling like she was going to faint this morning prior to going to the bathroom.  Patient c/o painful urination after.           History of Present Illness   Radha Salazar presents to the clinic c/o    Back Pain  This is a new problem. The current episode started yesterday. The problem occurs constantly. The problem is unchanged. Pain location: lower back R>L. The pain is moderate. Associated symptoms include dysuria and a fever. Pertinent negatives include no abdominal pain, bladder incontinence, bowel incontinence, leg pain, numbness, paresis, paresthesias, perianal numbness, tingling or weakness. She has tried nothing for the symptoms. The treatment provided no relief.       Review of Systems   Review of Systems   Constitutional:  Positive for fever. Negative for fatigue.   HENT:  Negative for congestion, ear discharge, ear pain and facial swelling.    Respiratory:  Negative for apnea, cough, chest  tightness, shortness of breath and wheezing.    Gastrointestinal:  Negative for abdominal pain and bowel incontinence.   Genitourinary:  Positive for dysuria and flank pain. Negative for bladder incontinence, frequency and urgency.   Musculoskeletal:  Positive for back pain.   Neurological:  Positive for dizziness (this AM, since resolved). Negative for tingling, weakness, numbness and paresthesias.         Current Medications     Long-Term Medications   Medication Sig Dispense Refill    Xulane 150-35 MCG/24HR apply 1 patch topically every week         Current Allergies     Allergies as of 01/31/2024    (No Known Allergies)            The following portions of the patient's history were reviewed and updated as appropriate: allergies, current medications, past family history, past medical history, past social history, past surgical history and problem list.  Past Medical History:   Diagnosis Date    Anxiety     Depressed     Seizure (HCC)      Past Surgical History:   Procedure Laterality Date    ADENOIDECTOMY      TONSILLECTOMY       Social History     Socioeconomic History    Marital status: Single     Spouse name: Not on file    Number of children: Not on file    Years of education: Not on file    Highest education level: Not on file   Occupational History    Not on file   Tobacco Use    Smoking status: Never    Smokeless tobacco: Never   Vaping Use    Vaping status: Never Used   Substance and Sexual Activity    Alcohol use: Yes     Comment: socially    Drug use: Never    Sexual activity: Yes     Partners: Male     Birth control/protection: OCP, Condom Male   Other Topics Concern    Not on file   Social History Narrative    Not on file     Social Determinants of Health     Financial Resource Strain: Not on file   Food Insecurity: Not on file   Transportation Needs: Not on file   Physical Activity: Not on file   Stress: Not on file   Social Connections: Not on file   Intimate Partner Violence: Not on file   Housing  "Stability: Not on file       Objective   /63   Pulse (!) 110   Temp (!) 101.1 °F (38.4 °C) (Temporal)   Resp 18   Ht 5' 2\" (1.575 m)   Wt 64.4 kg (142 lb)   LMP 01/24/2024 (Exact Date)   SpO2 97%   BMI 25.97 kg/m²      Physical Exam     Physical Exam  Vitals and nursing note reviewed.   Constitutional:       General: She is not in acute distress.     Appearance: She is well-developed. She is not diaphoretic.   HENT:      Head: Normocephalic and atraumatic.      Right Ear: External ear normal.      Left Ear: External ear normal.      Nose: Nose normal.      Mouth/Throat:      Mouth: Mucous membranes are moist.      Pharynx: No oropharyngeal exudate or posterior oropharyngeal erythema.   Eyes:      General: No scleral icterus.        Right eye: No discharge.         Left eye: No discharge.      Conjunctiva/sclera: Conjunctivae normal.   Cardiovascular:      Rate and Rhythm: Regular rhythm.      Heart sounds: Normal heart sounds. No murmur heard.     No friction rub. No gallop.   Pulmonary:      Effort: Pulmonary effort is normal. No respiratory distress.      Breath sounds: Normal breath sounds. No decreased breath sounds, wheezing, rhonchi or rales.   Abdominal:      General: Bowel sounds are normal.      Palpations: Abdomen is soft.      Tenderness: There is abdominal tenderness in the suprapubic area. There is right CVA tenderness. There is no left CVA tenderness, guarding or rebound. Negative signs include Miramontes's sign, Rovsing's sign and McBurney's sign.   Skin:     General: Skin is warm and dry.      Coloration: Skin is not pale.      Findings: No erythema or rash.   Neurological:      Mental Status: She is alert and oriented to person, place, and time.   Psychiatric:         Behavior: Behavior normal.         Thought Content: Thought content normal.         Judgment: Judgment normal.         Shaniqua Buenrostro PA-C        "

## 2024-02-02 LAB — BACTERIA UR CULT: ABNORMAL

## 2024-02-21 PROBLEM — Z00.121 ENCOUNTER FOR ROUTINE CHILD HEALTH EXAMINATION WITH ABNORMAL FINDINGS: Status: RESOLVED | Noted: 2019-11-15 | Resolved: 2024-02-21

## 2024-03-06 ENCOUNTER — APPOINTMENT (OUTPATIENT)
Dept: LAB | Facility: CLINIC | Age: 24
End: 2024-03-06
Payer: COMMERCIAL

## 2024-03-06 ENCOUNTER — OFFICE VISIT (OUTPATIENT)
Dept: FAMILY MEDICINE CLINIC | Facility: CLINIC | Age: 24
End: 2024-03-06
Payer: COMMERCIAL

## 2024-03-06 VITALS
OXYGEN SATURATION: 97 % | RESPIRATION RATE: 18 BRPM | HEART RATE: 83 BPM | TEMPERATURE: 98.4 F | BODY MASS INDEX: 26.5 KG/M2 | SYSTOLIC BLOOD PRESSURE: 102 MMHG | HEIGHT: 62 IN | WEIGHT: 144 LBS | DIASTOLIC BLOOD PRESSURE: 62 MMHG

## 2024-03-06 DIAGNOSIS — R07.9 CHEST PAIN, UNSPECIFIED TYPE: ICD-10-CM

## 2024-03-06 DIAGNOSIS — R68.89 FORGETFULNESS: ICD-10-CM

## 2024-03-06 DIAGNOSIS — R51.9 NONINTRACTABLE HEADACHE, UNSPECIFIED CHRONICITY PATTERN, UNSPECIFIED HEADACHE TYPE: Primary | ICD-10-CM

## 2024-03-06 DIAGNOSIS — R51.9 NONINTRACTABLE HEADACHE, UNSPECIFIED CHRONICITY PATTERN, UNSPECIFIED HEADACHE TYPE: ICD-10-CM

## 2024-03-06 DIAGNOSIS — G40.909 SEIZURE DISORDER (HCC): ICD-10-CM

## 2024-03-06 DIAGNOSIS — F33.2 SEVERE EPISODE OF RECURRENT MAJOR DEPRESSIVE DISORDER, WITHOUT PSYCHOTIC FEATURES (HCC): ICD-10-CM

## 2024-03-06 LAB
ALBUMIN SERPL BCP-MCNC: 4.7 G/DL (ref 3.5–5)
ALP SERPL-CCNC: 52 U/L (ref 34–104)
ALT SERPL W P-5'-P-CCNC: 11 U/L (ref 7–52)
ANION GAP SERPL CALCULATED.3IONS-SCNC: 11 MMOL/L
AST SERPL W P-5'-P-CCNC: 16 U/L (ref 13–39)
BASOPHILS # BLD AUTO: 0.04 THOUSANDS/ÂΜL (ref 0–0.1)
BASOPHILS NFR BLD AUTO: 0 % (ref 0–1)
BILIRUB SERPL-MCNC: 0.48 MG/DL (ref 0.2–1)
BUN SERPL-MCNC: 12 MG/DL (ref 5–25)
CALCIUM SERPL-MCNC: 9.7 MG/DL (ref 8.4–10.2)
CHLORIDE SERPL-SCNC: 103 MMOL/L (ref 96–108)
CO2 SERPL-SCNC: 25 MMOL/L (ref 21–32)
CREAT SERPL-MCNC: 0.76 MG/DL (ref 0.6–1.3)
EOSINOPHIL # BLD AUTO: 0.17 THOUSAND/ÂΜL (ref 0–0.61)
EOSINOPHIL NFR BLD AUTO: 2 % (ref 0–6)
ERYTHROCYTE [DISTWIDTH] IN BLOOD BY AUTOMATED COUNT: 14 % (ref 11.6–15.1)
FERRITIN SERPL-MCNC: 22 NG/ML (ref 11–307)
GFR SERPL CREATININE-BSD FRML MDRD: 111 ML/MIN/1.73SQ M
GLUCOSE P FAST SERPL-MCNC: 83 MG/DL (ref 65–99)
HCT VFR BLD AUTO: 44.8 % (ref 34.8–46.1)
HGB BLD-MCNC: 13.8 G/DL (ref 11.5–15.4)
IMM GRANULOCYTES # BLD AUTO: 0.05 THOUSAND/UL (ref 0–0.2)
IMM GRANULOCYTES NFR BLD AUTO: 1 % (ref 0–2)
IRON SATN MFR SERPL: 22 % (ref 15–50)
IRON SERPL-MCNC: 84 UG/DL (ref 50–212)
LYMPHOCYTES # BLD AUTO: 2.79 THOUSANDS/ÂΜL (ref 0.6–4.47)
LYMPHOCYTES NFR BLD AUTO: 26 % (ref 14–44)
MCH RBC QN AUTO: 26.3 PG (ref 26.8–34.3)
MCHC RBC AUTO-ENTMCNC: 30.8 G/DL (ref 31.4–37.4)
MCV RBC AUTO: 86 FL (ref 82–98)
MONOCYTES # BLD AUTO: 0.66 THOUSAND/ÂΜL (ref 0.17–1.22)
MONOCYTES NFR BLD AUTO: 6 % (ref 4–12)
NEUTROPHILS # BLD AUTO: 7.24 THOUSANDS/ÂΜL (ref 1.85–7.62)
NEUTS SEG NFR BLD AUTO: 65 % (ref 43–75)
NRBC BLD AUTO-RTO: 0 /100 WBCS
PLATELET # BLD AUTO: 337 THOUSANDS/UL (ref 149–390)
PMV BLD AUTO: 10.2 FL (ref 8.9–12.7)
POTASSIUM SERPL-SCNC: 4.4 MMOL/L (ref 3.5–5.3)
PROT SERPL-MCNC: 7.5 G/DL (ref 6.4–8.4)
RBC # BLD AUTO: 5.24 MILLION/UL (ref 3.81–5.12)
SODIUM SERPL-SCNC: 139 MMOL/L (ref 135–147)
TIBC SERPL-MCNC: 379 UG/DL (ref 250–450)
TSH SERPL DL<=0.05 MIU/L-ACNC: 2.01 UIU/ML (ref 0.45–4.5)
UIBC SERPL-MCNC: 295 UG/DL (ref 155–355)
WBC # BLD AUTO: 10.95 THOUSAND/UL (ref 4.31–10.16)

## 2024-03-06 PROCEDURE — 85025 COMPLETE CBC W/AUTO DIFF WBC: CPT

## 2024-03-06 PROCEDURE — 99214 OFFICE O/P EST MOD 30 MIN: CPT | Performed by: NURSE PRACTITIONER

## 2024-03-06 PROCEDURE — 86618 LYME DISEASE ANTIBODY: CPT

## 2024-03-06 PROCEDURE — 83550 IRON BINDING TEST: CPT

## 2024-03-06 PROCEDURE — 84443 ASSAY THYROID STIM HORMONE: CPT

## 2024-03-06 PROCEDURE — 82728 ASSAY OF FERRITIN: CPT

## 2024-03-06 PROCEDURE — 36415 COLL VENOUS BLD VENIPUNCTURE: CPT

## 2024-03-06 PROCEDURE — 83540 ASSAY OF IRON: CPT

## 2024-03-06 PROCEDURE — 80053 COMPREHEN METABOLIC PANEL: CPT

## 2024-03-06 NOTE — PROGRESS NOTES
Name: Radha Salazar      : 2000      MRN: [id number]  Encounter Provider: BOB Hutchins  Encounter Date: 3/6/2024   Encounter department: [facility]    Assessment & Plan  - Get labs done to rule out medical cause, if negative suspect that underlying anxiety to be the cause of her symptoms and is going to return to therapy.      1. Nonintractable headache, unspecified chronicity pattern, unspecified headache type  -     Lyme Total AB W Reflex to IGM/IGG; Future  -     TSH, 3rd generation with Free T4 reflex; Future  -     CBC and differential; Future  -     Iron Panel (Includes Ferritin, Iron Sat%, Iron, and TIBC); Future  -     Comprehensive metabolic panel; Future    2. Chest pain, unspecified type  -     TSH, 3rd generation with Free T4 reflex; Future  -     CBC and differential; Future  -     Iron Panel (Includes Ferritin, Iron Sat%, Iron, and TIBC); Future    3. Forgetfulness  -     Lyme Total AB W Reflex to IGM/IGG; Future  -     TSH, 3rd generation with Free T4 reflex; Future  -     CBC and differential; Future  -     Iron Panel (Includes Ferritin, Iron Sat%, Iron, and TIBC); Future    4. Severe episode of recurrent major depressive disorder, without psychotic features (HCC)    5. Seizure disorder (HCC)        Depression Screening and Follow-up Plan: Patient was screened for depression during today's encounter. They screened negative with a PHQ-9 score of 0.        Subjective      Patient here for acute visit with c/o memory loss. Boyfriend noticed.  Forgets what she is talking about when she is talking, forgetting passwords  that have been the same for years. Locked herself out of her phone. Getting weird HA and chest pains.  Exposure to someone who thinks they have meningitis.  Recently found out her father has cancer.   Does report a history of anxiety and admission to behavioral health for SI, record reviewed. Has been feeling a lot of stress recently.  "      Review of Systems   Constitutional: Negative.  Negative for chills and fatigue.   Respiratory:  Positive for chest tightness. Negative for shortness of breath and wheezing.    Cardiovascular:  Positive for chest pain.   Gastrointestinal: Negative.    Musculoskeletal: Negative.    Neurological:  Positive for headaches.   Psychiatric/Behavioral:  Positive for decreased concentration.         Forgetful       Current Outpatient Medications on File Prior to Visit   Medication Sig    Xulane 150-35 MCG/24HR apply 1 patch topically every week       Objective     /62   Pulse 83   Temp 98.4 °F (36.9 °C)   Resp 18   Ht 5' 2\" (1.575 m)   Wt 65.3 kg (144 lb)   SpO2 97%   BMI 26.34 kg/m²     Physical Exam  Vitals and nursing note reviewed.   Constitutional:       General: She is not in acute distress.     Appearance: Normal appearance. She is well-developed. She is not ill-appearing.   Eyes:      General: Lids are normal.   Cardiovascular:      Rate and Rhythm: Normal rate and regular rhythm.      Heart sounds: Normal heart sounds. No murmur heard.  Pulmonary:      Effort: Pulmonary effort is normal. No respiratory distress.      Breath sounds: Normal breath sounds. No decreased breath sounds or wheezing.   Musculoskeletal:         General: No tenderness or deformity. Normal range of motion.   Skin:     General: Skin is warm.      Findings: No erythema or rash.   Neurological:      General: No focal deficit present.      Mental Status: She is alert and oriented to person, place, and time.   Psychiatric:         Mood and Affect: Mood normal.         Behavior: Behavior normal. Behavior is cooperative.         Thought Content: Thought content normal.       BOB Hutchins    "

## 2024-03-07 LAB — B BURGDOR IGG+IGM SER QL IA: NEGATIVE

## 2024-05-12 ENCOUNTER — OFFICE VISIT (OUTPATIENT)
Dept: URGENT CARE | Facility: CLINIC | Age: 24
End: 2024-05-12
Payer: COMMERCIAL

## 2024-05-12 VITALS
HEIGHT: 62 IN | BODY MASS INDEX: 25.03 KG/M2 | SYSTOLIC BLOOD PRESSURE: 117 MMHG | WEIGHT: 136 LBS | HEART RATE: 76 BPM | RESPIRATION RATE: 18 BRPM | OXYGEN SATURATION: 96 % | TEMPERATURE: 98.6 F | DIASTOLIC BLOOD PRESSURE: 76 MMHG

## 2024-05-12 DIAGNOSIS — J02.0 STREP THROAT: Primary | ICD-10-CM

## 2024-05-12 PROBLEM — S83.207A MCMURRAY TEST POSITIVE, LEFT, INITIAL ENCOUNTER: Status: RESOLVED | Noted: 2023-10-07 | Resolved: 2024-05-12

## 2024-05-12 PROBLEM — S89.92XA KNEE INJURY, LEFT, INITIAL ENCOUNTER: Status: RESOLVED | Noted: 2023-10-07 | Resolved: 2024-05-12

## 2024-05-12 PROBLEM — G40.909 SEIZURE DISORDER (HCC): Status: RESOLVED | Noted: 2019-11-15 | Resolved: 2024-05-12

## 2024-05-12 PROBLEM — F33.2 SEVERE EPISODE OF RECURRENT MAJOR DEPRESSIVE DISORDER, WITHOUT PSYCHOTIC FEATURES (HCC): Status: RESOLVED | Noted: 2022-12-08 | Resolved: 2024-05-12

## 2024-05-12 PROBLEM — F32.0 CURRENT MILD EPISODE OF MAJOR DEPRESSIVE DISORDER WITHOUT PRIOR EPISODE (HCC): Status: RESOLVED | Noted: 2019-11-15 | Resolved: 2024-05-12

## 2024-05-12 PROBLEM — Z72.51 SEXUALLY ACTIVE AT YOUNG AGE: Status: RESOLVED | Noted: 2019-11-15 | Resolved: 2024-05-12

## 2024-05-12 PROBLEM — Z23 NEED FOR VACCINATION: Status: RESOLVED | Noted: 2019-11-15 | Resolved: 2024-05-12

## 2024-05-12 LAB — S PYO AG THROAT QL: POSITIVE

## 2024-05-12 PROCEDURE — 99213 OFFICE O/P EST LOW 20 MIN: CPT | Performed by: NURSE PRACTITIONER

## 2024-05-12 PROCEDURE — 87880 STREP A ASSAY W/OPTIC: CPT | Performed by: NURSE PRACTITIONER

## 2024-05-12 RX ORDER — AMOXICILLIN 500 MG/1
500 CAPSULE ORAL 2 TIMES DAILY
Qty: 20 CAPSULE | Refills: 0 | Status: SHIPPED | OUTPATIENT
Start: 2024-05-12 | End: 2024-05-22

## 2024-05-12 NOTE — PROGRESS NOTES
[facility]        NAME: Radha Salazar is a 24 y.o. female  : 2000    MRN: [id number]  DATE: May 12, 2024  TIME: 11:01 AM      Assessment and Plan     Strep throat [J02.0]  1. Strep throat  POCT rapid ANTIGEN strepA    amoxicillin (AMOXIL) 500 mg capsule            Patient Instructions     Patient Instructions   Strep Throat   AMBULATORY CARE:   Strep throat  is a throat infection caused by bacteria. It is easily spread from person to person.  Common symptoms include the following:   Sore, red, and swollen throat    Fever and headache     Upset stomach, abdominal pain, or vomiting    White or yellow patches or blisters in the back of your throat    Tender, swollen lumps on the sides of your neck or jaw    Throat pain when you swallow    Call 911 for any of the following:   You have trouble breathing.      Seek care immediately if:   You have new symptoms like a bad headache, stiff neck, chest pain, or vomiting.    You are drooling because you cannot swallow your spit.    Contact your healthcare provider if:   You have a fever.    You have a rash or ear pain.    You have green, yellow-brown, or bloody mucus when you cough or blow your nose.    You are unable to drink anything.    You have questions or concerns about your condition or care.    Treatment for strep throat  may include antibiotic medicine to treat your strep throat. You should feel better within 2 to 3 days after you start antibiotics. You may return to work or school 24 hours after you start antibiotics.  Manage strep throat:   Use lozenges, ice, soft foods, or popsicles  to soothe your throat.    Drink juice, milk shakes, or soup  if your throat is too sore to eat solid food. Drinking liquids can also help prevent dehydration.    Gargle with salt water.  Mix ¼ teaspoon salt in a glass of warm water and gargle. This may help reduce swelling in your throat.    Do not smoke.  Nicotine and other chemicals in cigarettes and cigars  can cause lung damage and make your symptoms worse. Ask your healthcare provider for information if you currently smoke and need help to quit. E-cigarettes or smokeless tobacco still contain nicotine. Talk to your healthcare provider before you use these products.    Prevent the spread of strep throat:   Wash your hands often.  Use soap and water. Wash your hands after you use the bathroom, change a child's diapers, or sneeze. Wash your hands before you prepare or eat food.     Do not share food or drinks.  Replace your toothbrush after you have taken antibiotics for 24 hours.    Follow up with your doctor as directed:  Write down your questions so you remember to ask them during your visits.  © Copyright [employer] 2023 Information is for End User's use only and may not be sold, redistributed or otherwise used for commercial purposes.  The above information is an  only. It is not intended as medical advice for individual conditions or treatments. Talk to your doctor, nurse or pharmacist before following any medical regimen to see if it is safe and effective for you.      Follow up with PCP in 3-5 days.  Proceed to  ER if symptoms worsen.    Chief Complaint     Chief Complaint   Patient presents with    Sore Throat     Patient c/o sore throat and runny nose that started 6 days ago.          History of Present Illness     Patient presents with runny nose and significant sore throat x 6 days that has been steadily getting worse not better.  Swallowing is very painful.  No specific sick contacts, but she is a special  for middle school students.        Review of Systems     Review of Systems   HENT:  Positive for rhinorrhea, sore throat and trouble swallowing.    All other systems reviewed and are negative.        Current Medications       Current Outpatient Medications:     amoxicillin (AMOXIL) 500 mg capsule, Take 1 capsule (500 mg total) by mouth 2 (two) times a day for 10 days, Disp: 20  "capsule, Rfl: 0    Xulane 150-35 MCG/24HR, apply 1 patch topically every week, Disp: , Rfl:     Current Allergies     Allergies as of 05/12/2024    (No Known Allergies)              The following portions of the patient's history were reviewed and updated as appropriate: allergies, current medications, past family history, past medical history, past social history, past surgical history and problem list.     Past Medical History:   Diagnosis Date    Anxiety     Depressed     Seizure (HCC)     Seizure disorder (HCC) 11/15/2019       Past Surgical History:   Procedure Laterality Date    ADENOIDECTOMY      TONSILLECTOMY         Family History   Problem Relation Age of Onset    Hypertension Mother     No Known Problems Father          Medications have been verified.        Objective     /76   Pulse 76   Temp 98.6 °F (37 °C) (Temporal)   Resp 18   Ht 5' 2\" (1.575 m)   Wt 61.7 kg (136 lb)   LMP 05/06/2024 (Exact Date)   SpO2 96%   BMI 24.87 kg/m²   Patient's last menstrual period was 05/06/2024 (exact date).         Physical Exam     Physical Exam  Vitals and nursing note reviewed.   Constitutional:       General: She is not in acute distress.     Appearance: Normal appearance. She is well-developed and well-groomed. She is ill-appearing. She is not toxic-appearing or diaphoretic.   HENT:      Head: Normocephalic and atraumatic.      Nose: Congestion present.      Mouth/Throat:      Mouth: Mucous membranes are moist.      Pharynx: Posterior oropharyngeal erythema present.      Tonsils: 0 on the right. 0 on the left.   Eyes:      Pupils: Pupils are equal, round, and reactive to light.   Pulmonary:      Effort: Pulmonary effort is normal. No respiratory distress.   Abdominal:      General: There is no distension.      Palpations: Abdomen is soft.   Musculoskeletal:         General: Normal range of motion.      Cervical back: Normal range of motion and neck supple.   Lymphadenopathy:      Cervical: Cervical " adenopathy present.   Skin:     General: Skin is warm and dry.      Capillary Refill: Capillary refill takes less than 2 seconds.   Neurological:      General: No focal deficit present.      Mental Status: She is alert and oriented to person, place, and time.   Psychiatric:         Mood and Affect: Mood and affect normal.         Behavior: Behavior normal. Behavior is cooperative.         Thought Content: Thought content normal.         Judgment: Judgment normal.

## 2024-05-12 NOTE — PATIENT INSTRUCTIONS
Strep Throat   AMBULATORY CARE:   Strep throat  is a throat infection caused by bacteria. It is easily spread from person to person.  Common symptoms include the following:   Sore, red, and swollen throat    Fever and headache     Upset stomach, abdominal pain, or vomiting    White or yellow patches or blisters in the back of your throat    Tender, swollen lumps on the sides of your neck or jaw    Throat pain when you swallow    Call 911 for any of the following:   You have trouble breathing.      Seek care immediately if:   You have new symptoms like a bad headache, stiff neck, chest pain, or vomiting.    You are drooling because you cannot swallow your spit.    Contact your healthcare provider if:   You have a fever.    You have a rash or ear pain.    You have green, yellow-brown, or bloody mucus when you cough or blow your nose.    You are unable to drink anything.    You have questions or concerns about your condition or care.    Treatment for strep throat  may include antibiotic medicine to treat your strep throat. You should feel better within 2 to 3 days after you start antibiotics. You may return to work or school 24 hours after you start antibiotics.  Manage strep throat:   Use lozenges, ice, soft foods, or popsicles  to soothe your throat.    Drink juice, milk shakes, or soup  if your throat is too sore to eat solid food. Drinking liquids can also help prevent dehydration.    Gargle with salt water.  Mix ¼ teaspoon salt in a glass of warm water and gargle. This may help reduce swelling in your throat.    Do not smoke.  Nicotine and other chemicals in cigarettes and cigars can cause lung damage and make your symptoms worse. Ask your healthcare provider for information if you currently smoke and need help to quit. E-cigarettes or smokeless tobacco still contain nicotine. Talk to your healthcare provider before you use these products.    Prevent the spread of strep throat:   Wash your hands often.  Use soap  and water. Wash your hands after you use the bathroom, change a child's diapers, or sneeze. Wash your hands before you prepare or eat food.     Do not share food or drinks.  Replace your toothbrush after you have taken antibiotics for 24 hours.    Follow up with your doctor as directed:  Write down your questions so you remember to ask them during your visits.  © Copyright [employer] 2023 Information is for End User's use only and may not be sold, redistributed or otherwise used for commercial purposes.  The above information is an  only. It is not intended as medical advice for individual conditions or treatments. Talk to your doctor, nurse or pharmacist before following any medical regimen to see if it is safe and effective for you.

## 2024-05-12 NOTE — LETTER
May 12, 2024     Patient: Radha Salazar   YOB: 2000   Date of Visit: 5/12/2024       To Whom It May Concern:    It is my medical opinion that Radha Salazar may return to work on 5/14 or 5/15, depending on symptoms.  Please excuse for time missed due to acute illness .    If you have any questions or concerns, please don't hesitate to call.         Sincerely,        BOB Bunch    CC: No Recipients

## 2024-06-19 ENCOUNTER — OFFICE VISIT (OUTPATIENT)
Dept: URGENT CARE | Facility: CLINIC | Age: 24
End: 2024-06-19
Payer: COMMERCIAL

## 2024-06-19 VITALS
RESPIRATION RATE: 18 BRPM | WEIGHT: 136 LBS | DIASTOLIC BLOOD PRESSURE: 70 MMHG | OXYGEN SATURATION: 99 % | SYSTOLIC BLOOD PRESSURE: 122 MMHG | HEART RATE: 64 BPM | BODY MASS INDEX: 25.03 KG/M2 | TEMPERATURE: 97.9 F | HEIGHT: 62 IN

## 2024-06-19 DIAGNOSIS — H60.392 ACUTE INFECTION OF EXTERNAL EAR, LEFT: Primary | ICD-10-CM

## 2024-06-19 PROCEDURE — 99213 OFFICE O/P EST LOW 20 MIN: CPT | Performed by: NURSE PRACTITIONER

## 2024-06-19 RX ORDER — PREDNISONE 20 MG/1
TABLET ORAL
Qty: 11 TABLET | Refills: 0 | Status: SHIPPED | OUTPATIENT
Start: 2024-06-19

## 2024-06-19 RX ORDER — AMOXICILLIN AND CLAVULANATE POTASSIUM 875; 125 MG/1; MG/1
1 TABLET, FILM COATED ORAL EVERY 12 HOURS SCHEDULED
Qty: 14 TABLET | Refills: 0 | Status: SHIPPED | OUTPATIENT
Start: 2024-06-19 | End: 2024-06-26

## 2024-06-19 NOTE — PATIENT INSTRUCTIONS
After 24 hours, symptoms should not get worse.  It may take a few days to start to see improvement.  If symptoms are worsening after 24 hours or if you are not seeing improvement, you should be seen again.  Cellulitis   AMBULATORY CARE:   Cellulitis  is a skin infection caused by bacteria. Cellulitis is common and can become severe. Cellulitis usually appears on the lower legs. It can also appear on the arms, face, and other areas. Cellulitis develops when bacteria enter a crack or break in your skin, such as a scratch, bite, or cut.       Common signs and symptoms:  Signs and symptoms usually appear on one side of your body. You may have any of the following:  A fever    A red, warm, swollen area on your skin    Pain when the area is touched    Red spots, bumps, or blisters    Bumpy, raised skin that feels like an orange peel    Seek care immediately if:   Your wound gets larger and more painful.    You feel a crackling under your skin when you touch it.    You have purple dots or bumps on your skin, or you see bleeding under your skin.    You see red streaks coming from the infected area.    Call your doctor if:   The red, warm, swollen area gets larger.    Your fever or pain does not go away or gets worse.    The area does not get smaller after 3 days of antibiotics.    You have questions or concerns about your condition or care.    Treatment:  You should start to see improvement in 3 days. If your cellulitis is severe, you may need IV antibiotics in the hospital. If cellulitis is not treated, the infection can spread through your body and become life-threatening. You may need any of the following medicines:  Antibiotics  help treat a bacterial infection.    Acetaminophen  decreases pain and fever. It is available without a doctor's order. Ask how much to take and how often to take it. Follow directions. Read the labels of all other medicines you are using to see if they also contain acetaminophen, or ask your  doctor or pharmacist. Acetaminophen can cause liver damage if not taken correctly.    NSAIDs , such as ibuprofen, help decrease swelling, pain, and fever. This medicine is available with or without a doctor's order. NSAIDs can cause stomach bleeding or kidney problems in certain people. If you take blood thinner medicine, always ask your healthcare provider if NSAIDs are safe for you. Always read the medicine label and follow directions.    Take your medicine as directed.  Contact your healthcare provider if you think your medicine is not helping or if you have side effects. Tell your provider if you are allergic to any medicine. Keep a list of the medicines, vitamins, and herbs you take. Include the amounts, and when and why you take them. Bring the list or the pill bottles to follow-up visits. Carry your medicine list with you in case of an emergency.    Self-care:   Wash the area with soap and water every day.  Gently pat dry. Use bandages if directed by your healthcare provider.    Apply cream or ointment as directed.  These help protect the area. Most over-the-counter products, such as petroleum jelly, are good to use. Ask your healthcare provider about specific creams or ointments you should use.    Place a cool, damp cloth on the area.  Use clean cloths and clean water. You can do this as often as you need to. Cool, damp cloths may help decrease pain.    Elevate the area above the level of your heart  as often as you can. This will help decrease swelling and pain. Prop the area on pillows or blankets to keep it elevated comfortably.       Prevent cellulitis:   Do not scratch bug bites or areas of injury.  You increase your risk for cellulitis by scratching these areas.    Do not share personal items, such as towels, clothing, and razors.    Clean exercise equipment  with germ-killing  before and after you use it.    Treat athlete's foot.  This can help prevent the spread of a bacterial skin  infection.    Wash your hands often.  Use soap and water. Wash your hands after you use the bathroom, change a child's diapers, or sneeze. Wash your hands before you prepare or eat food. Use lotion to prevent dry, cracked skin.       Follow up with your doctor within 3 days, or as directed:  He or she will check if your cellulitis is getting better. Write down your questions so you remember to ask them during your visits.  © Copyright [employer] 2023 Information is for End User's use only and may not be sold, redistributed or otherwise used for commercial purposes.  The above information is an  only. It is not intended as medical advice for individual conditions or treatments. Talk to your doctor, nurse or pharmacist before following any medical regimen to see if it is safe and effective for you.

## 2024-06-19 NOTE — PROGRESS NOTES
[facility]        NAME: Radha Salazar is a 24 y.o. female  : 2000    MRN: [id number]  DATE: 2024  TIME: 12:37 PM      Assessment and Plan     Acute infection of external ear, left [H60.392]  1. Acute infection of external ear, left  amoxicillin-clavulanate (AUGMENTIN) 875-125 mg per tablet    predniSONE 20 mg tablet            Patient Instructions     Patient Instructions   After 24 hours, symptoms should not get worse.  It may take a few days to start to see improvement.  If symptoms are worsening after 24 hours or if you are not seeing improvement, you should be seen again.  Cellulitis   AMBULATORY CARE:   Cellulitis  is a skin infection caused by bacteria. Cellulitis is common and can become severe. Cellulitis usually appears on the lower legs. It can also appear on the arms, face, and other areas. Cellulitis develops when bacteria enter a crack or break in your skin, such as a scratch, bite, or cut.       Common signs and symptoms:  Signs and symptoms usually appear on one side of your body. You may have any of the following:  A fever    A red, warm, swollen area on your skin    Pain when the area is touched    Red spots, bumps, or blisters    Bumpy, raised skin that feels like an orange peel    Seek care immediately if:   Your wound gets larger and more painful.    You feel a crackling under your skin when you touch it.    You have purple dots or bumps on your skin, or you see bleeding under your skin.    You see red streaks coming from the infected area.    Call your doctor if:   The red, warm, swollen area gets larger.    Your fever or pain does not go away or gets worse.    The area does not get smaller after 3 days of antibiotics.    You have questions or concerns about your condition or care.    Treatment:  You should start to see improvement in 3 days. If your cellulitis is severe, you may need IV antibiotics in the hospital. If cellulitis is not treated, the infection  can spread through your body and become life-threatening. You may need any of the following medicines:  Antibiotics  help treat a bacterial infection.    Acetaminophen  decreases pain and fever. It is available without a doctor's order. Ask how much to take and how often to take it. Follow directions. Read the labels of all other medicines you are using to see if they also contain acetaminophen, or ask your doctor or pharmacist. Acetaminophen can cause liver damage if not taken correctly.    NSAIDs , such as ibuprofen, help decrease swelling, pain, and fever. This medicine is available with or without a doctor's order. NSAIDs can cause stomach bleeding or kidney problems in certain people. If you take blood thinner medicine, always ask your healthcare provider if NSAIDs are safe for you. Always read the medicine label and follow directions.    Take your medicine as directed.  Contact your healthcare provider if you think your medicine is not helping or if you have side effects. Tell your provider if you are allergic to any medicine. Keep a list of the medicines, vitamins, and herbs you take. Include the amounts, and when and why you take them. Bring the list or the pill bottles to follow-up visits. Carry your medicine list with you in case of an emergency.    Self-care:   Wash the area with soap and water every day.  Gently pat dry. Use bandages if directed by your healthcare provider.    Apply cream or ointment as directed.  These help protect the area. Most over-the-counter products, such as petroleum jelly, are good to use. Ask your healthcare provider about specific creams or ointments you should use.    Place a cool, damp cloth on the area.  Use clean cloths and clean water. You can do this as often as you need to. Cool, damp cloths may help decrease pain.    Elevate the area above the level of your heart  as often as you can. This will help decrease swelling and pain. Prop the area on pillows or blankets to  keep it elevated comfortably.       Prevent cellulitis:   Do not scratch bug bites or areas of injury.  You increase your risk for cellulitis by scratching these areas.    Do not share personal items, such as towels, clothing, and razors.    Clean exercise equipment  with germ-killing  before and after you use it.    Treat athlete's foot.  This can help prevent the spread of a bacterial skin infection.    Wash your hands often.  Use soap and water. Wash your hands after you use the bathroom, change a child's diapers, or sneeze. Wash your hands before you prepare or eat food. Use lotion to prevent dry, cracked skin.       Follow up with your doctor within 3 days, or as directed:  He or she will check if your cellulitis is getting better. Write down your questions so you remember to ask them during your visits.  © Copyright [employer] 2023 Information is for End User's use only and may not be sold, redistributed or otherwise used for commercial purposes.  The above information is an  only. It is not intended as medical advice for individual conditions or treatments. Talk to your doctor, nurse or pharmacist before following any medical regimen to see if it is safe and effective for you.      Follow up with PCP in 3-5 days.  Proceed to  ER if symptoms worsen.    Chief Complaint     Chief Complaint   Patient presents with    Edema     Patient c/o left ear swelling. Patient did get a new piercing in ear two weeks ago.  Patient fell on left ear three days ago.         History of Present Illness     Patient presents with a red swollen left ear.  She reports getting a new piercing in the ear about 2 weeks ago.  She states the piercing right below it is also a fairly new piercing.  She has all other piercings removed.  She states that her newest piercing seem to be healing fine but she was playing with her kids and ended up falling on the ground and hitting her left ear.  Since then the area has become  "increasingly reddened and swollen.  The earring has not been sucked inside the actual ear--front and back both posts are visible.  The earring stud is a decent size (estimated 4 to 5 mm in diameter?) And patient states that she did that on purpose to decrease risk of the earring being stuck inside the new piercing.  She presents today concerned about infection        Review of Systems     Review of Systems   HENT:  Positive for ear pain. Negative for ear discharge.    Skin:  Positive for color change.   All other systems reviewed and are negative.        Current Medications       Current Outpatient Medications:     amoxicillin-clavulanate (AUGMENTIN) 875-125 mg per tablet, Take 1 tablet by mouth every 12 (twelve) hours for 7 days, Disp: 14 tablet, Rfl: 0    predniSONE 20 mg tablet, 1 tab BID x 4 days, 1 tab daily x 3 days, Disp: 11 tablet, Rfl: 0    Xulane 150-35 MCG/24HR, apply 1 patch topically every week, Disp: , Rfl:     Current Allergies     Allergies as of 06/19/2024    (No Known Allergies)              The following portions of the patient's history were reviewed and updated as appropriate: allergies, current medications, past family history, past medical history, past social history, past surgical history and problem list.     Past Medical History:   Diagnosis Date    Anxiety     Depressed     Seizure (HCC)     Seizure disorder (HCC) 11/15/2019       Past Surgical History:   Procedure Laterality Date    ADENOIDECTOMY      TONSILLECTOMY         Family History   Problem Relation Age of Onset    Hypertension Mother     No Known Problems Father          Medications have been verified.        Objective     /70   Pulse 64   Temp 97.9 °F (36.6 °C) (Temporal)   Resp 18   Ht 5' 2\" (1.575 m)   Wt 61.7 kg (136 lb)   SpO2 99%   BMI 24.87 kg/m²   No LMP recorded. (Menstrual status: Birth Control).         Physical Exam     Physical Exam  Vitals and nursing note reviewed.   Constitutional:       General: She " is not in acute distress.     Appearance: Normal appearance. She is well-developed. She is not ill-appearing, toxic-appearing or diaphoretic.   HENT:      Head: Normocephalic and atraumatic.      Left Ear: Tympanic membrane and ear canal normal. Swelling and tenderness present.      Ears:        Comments: Redness and swelling of the left external ear focal around the newer piercings with the redness spreading.  Pulmonary:      Effort: Pulmonary effort is normal. No respiratory distress.   Abdominal:      General: There is no distension.      Palpations: Abdomen is soft.   Musculoskeletal:         General: Normal range of motion.   Skin:     General: Skin is warm and dry.   Neurological:      General: No focal deficit present.      Mental Status: She is alert and oriented to person, place, and time.   Psychiatric:         Mood and Affect: Mood and affect normal.         Behavior: Behavior normal. Behavior is cooperative.         Thought Content: Thought content normal.         Judgment: Judgment normal.

## 2024-08-14 ENCOUNTER — OFFICE VISIT (OUTPATIENT)
Dept: URGENT CARE | Facility: CLINIC | Age: 24
End: 2024-08-14
Payer: COMMERCIAL

## 2024-08-14 VITALS
HEART RATE: 71 BPM | WEIGHT: 136 LBS | OXYGEN SATURATION: 99 % | DIASTOLIC BLOOD PRESSURE: 80 MMHG | HEIGHT: 62 IN | RESPIRATION RATE: 18 BRPM | TEMPERATURE: 97.7 F | SYSTOLIC BLOOD PRESSURE: 114 MMHG | BODY MASS INDEX: 25.03 KG/M2

## 2024-08-14 DIAGNOSIS — R30.0 BURNING WITH URINATION: Primary | ICD-10-CM

## 2024-08-14 LAB
SL AMB  POCT GLUCOSE, UA: NEGATIVE
SL AMB LEUKOCYTE ESTERASE,UA: ABNORMAL
SL AMB POCT BILIRUBIN,UA: NEGATIVE
SL AMB POCT BLOOD,UA: ABNORMAL
SL AMB POCT CLARITY,UA: CLEAR
SL AMB POCT COLOR,UA: YELLOW
SL AMB POCT KETONES,UA: NEGATIVE
SL AMB POCT NITRITE,UA: NEGATIVE
SL AMB POCT PH,UA: 7
SL AMB POCT SPECIFIC GRAVITY,UA: 1.01
SL AMB POCT URINE PROTEIN: NEGATIVE
SL AMB POCT UROBILINOGEN: 0.2

## 2024-08-14 PROCEDURE — 87186 SC STD MICRODIL/AGAR DIL: CPT | Performed by: PREVENTIVE MEDICINE

## 2024-08-14 PROCEDURE — 99213 OFFICE O/P EST LOW 20 MIN: CPT | Performed by: PREVENTIVE MEDICINE

## 2024-08-14 PROCEDURE — 87077 CULTURE AEROBIC IDENTIFY: CPT | Performed by: PREVENTIVE MEDICINE

## 2024-08-14 PROCEDURE — 81002 URINALYSIS NONAUTO W/O SCOPE: CPT | Performed by: PREVENTIVE MEDICINE

## 2024-08-14 PROCEDURE — 87086 URINE CULTURE/COLONY COUNT: CPT | Performed by: PREVENTIVE MEDICINE

## 2024-08-14 RX ORDER — NITROFURANTOIN 25; 75 MG/1; MG/1
100 CAPSULE ORAL 2 TIMES DAILY
Qty: 10 CAPSULE | Refills: 0 | Status: SHIPPED | OUTPATIENT
Start: 2024-08-14 | End: 2024-08-19

## 2024-08-14 RX ORDER — PHENAZOPYRIDINE HYDROCHLORIDE 100 MG/1
100 TABLET, FILM COATED ORAL 3 TIMES DAILY PRN
Qty: 10 TABLET | Refills: 0 | Status: SHIPPED | OUTPATIENT
Start: 2024-08-14

## 2024-08-14 NOTE — PROGRESS NOTES
[facility]        NAME: Radha Salazar is a 24 y.o. female  : 2000    MRN: [id number]  DATE: 2024  TIME: 3:41 PM    Assessment and Plan   Burning with urination [R30.0]  1. Burning with urination  POCT urine dip    Urine culture            Patient Instructions       Follow up with PCP in 3-5 days.  Proceed to  ER if symptoms worsen.    If tests have been performed at [facility], our office will contact you with results if changes need to be made to the care plan discussed with you at the visit.  You can review your full results on [facility].    Chief Complaint     Chief Complaint   Patient presents with    Possible UTI     Patient c/o burning with urination and flank pain that started two days ago.         History of Present Illness       Burning of urine and frequency of urine x 1 to 2 days.  No back pain no fever        Review of Systems   Review of Systems   Genitourinary:  Positive for dysuria and frequency. Negative for flank pain.         Current Medications       Current Outpatient Medications:     Xulane 150-35 MCG/24HR, apply 1 patch topically every week, Disp: , Rfl:     predniSONE 20 mg tablet, 1 tab BID x 4 days, 1 tab daily x 3 days (Patient not taking: Reported on 2024), Disp: 11 tablet, Rfl: 0    Current Allergies     Allergies as of 2024    (No Known Allergies)            The following portions of the patient's history were reviewed and updated as appropriate: allergies, current medications, past family history, past medical history, past social history, past surgical history and problem list.     Past Medical History:   Diagnosis Date    Anxiety     Depressed     Seizure (HCC)     Seizure disorder (HCC) 11/15/2019       Past Surgical History:   Procedure Laterality Date    ADENOIDECTOMY      TONSILLECTOMY         Family History   Problem Relation Age of Onset    Hypertension Mother     No Known Problems Father          Medications have been  "verified.        Objective   /80   Pulse 71   Temp 97.7 °F (36.5 °C) (Temporal)   Resp 18   Ht 5' 2\" (1.575 m)   Wt 61.7 kg (136 lb)   LMP 07/23/2024 (Approximate)   SpO2 99%   BMI 24.87 kg/m²   Patient's last menstrual period was 07/23/2024 (approximate).       Physical Exam     Physical Exam  Genitourinary:     Comments: 4+ white cells.  No tenderness to CVA percussion                  "

## 2024-08-14 NOTE — PATIENT INSTRUCTIONS
Increase your fluid intake.  If you develop back pain or fever you must be rechecked medically immediately.   Call in 2 days to check on the results of the urine culture and sensitivity to the antibiotic I gave you.

## 2024-08-16 LAB — BACTERIA UR CULT: ABNORMAL

## 2024-09-30 ENCOUNTER — HOSPITAL ENCOUNTER (EMERGENCY)
Facility: HOSPITAL | Age: 24
Discharge: HOME | End: 2024-09-30
Attending: EMERGENCY MEDICINE
Payer: COMMERCIAL

## 2024-09-30 ENCOUNTER — OFFICE VISIT (OUTPATIENT)
Dept: URGENT CARE | Facility: CLINIC | Age: 24
End: 2024-09-30
Payer: COMMERCIAL

## 2024-09-30 ENCOUNTER — APPOINTMENT (EMERGENCY)
Dept: CT IMAGING | Facility: HOSPITAL | Age: 24
End: 2024-09-30
Payer: COMMERCIAL

## 2024-09-30 VITALS
TEMPERATURE: 98.1 F | HEIGHT: 62 IN | BODY MASS INDEX: 26.68 KG/M2 | RESPIRATION RATE: 18 BRPM | SYSTOLIC BLOOD PRESSURE: 119 MMHG | HEART RATE: 68 BPM | DIASTOLIC BLOOD PRESSURE: 68 MMHG | WEIGHT: 145 LBS | OXYGEN SATURATION: 98 %

## 2024-09-30 VITALS
HEART RATE: 76 BPM | TEMPERATURE: 98.1 F | WEIGHT: 146.8 LBS | SYSTOLIC BLOOD PRESSURE: 100 MMHG | BODY MASS INDEX: 27.02 KG/M2 | OXYGEN SATURATION: 99 % | RESPIRATION RATE: 16 BRPM | HEIGHT: 62 IN | DIASTOLIC BLOOD PRESSURE: 65 MMHG

## 2024-09-30 DIAGNOSIS — R10.84 GENERALIZED ABDOMINAL PAIN: ICD-10-CM

## 2024-09-30 DIAGNOSIS — N26.1 RENAL ATROPHY, RIGHT: ICD-10-CM

## 2024-09-30 DIAGNOSIS — R10.9 FLANK PAIN: ICD-10-CM

## 2024-09-30 DIAGNOSIS — R10.9 ABDOMINAL PAIN: Primary | ICD-10-CM

## 2024-09-30 DIAGNOSIS — M54.50 RIGHT-SIDED LOW BACK PAIN WITHOUT SCIATICA, UNSPECIFIED CHRONICITY: Primary | ICD-10-CM

## 2024-09-30 LAB
ALBUMIN SERPL BCG-MCNC: 4.4 G/DL (ref 3.5–5)
ALP SERPL-CCNC: 61 U/L (ref 34–104)
ALT SERPL W P-5'-P-CCNC: 9 U/L (ref 7–52)
ANION GAP SERPL CALCULATED.3IONS-SCNC: 9 MMOL/L (ref 4–13)
AST SERPL W P-5'-P-CCNC: 13 U/L (ref 13–39)
B-HCG SERPL-ACNC: <0.6 MIU/ML (ref 0–5)
BASOPHILS # BLD AUTO: 0.02 THOUSANDS/ÂΜL (ref 0–0.1)
BASOPHILS NFR BLD AUTO: 0 % (ref 0–1)
BILIRUB SERPL-MCNC: 0.38 MG/DL (ref 0.2–1)
BILIRUB UR QL STRIP: NEGATIVE
BUN SERPL-MCNC: 12 MG/DL (ref 5–25)
CALCIUM SERPL-MCNC: 9.6 MG/DL (ref 8.4–10.2)
CHLORIDE SERPL-SCNC: 105 MMOL/L (ref 96–108)
CLARITY UR: CLEAR
CO2 SERPL-SCNC: 24 MMOL/L (ref 21–32)
COLOR UR: YELLOW
CREAT SERPL-MCNC: 0.86 MG/DL (ref 0.6–1.3)
EOSINOPHIL # BLD AUTO: 0.1 THOUSAND/ÂΜL (ref 0–0.61)
EOSINOPHIL NFR BLD AUTO: 1 % (ref 0–6)
ERYTHROCYTE [DISTWIDTH] IN BLOOD BY AUTOMATED COUNT: 13.1 % (ref 11.6–15.1)
GFR SERPL CREATININE-BSD FRML MDRD: 95 ML/MIN/1.73SQ M
GLUCOSE SERPL-MCNC: 89 MG/DL (ref 65–140)
GLUCOSE UR STRIP-MCNC: NEGATIVE MG/DL
HCT VFR BLD AUTO: 44.9 % (ref 34.8–46.1)
HGB BLD-MCNC: 14.2 G/DL (ref 11.5–15.4)
HGB UR QL STRIP.AUTO: NEGATIVE
IMM GRANULOCYTES # BLD AUTO: 0.03 THOUSAND/UL (ref 0–0.2)
IMM GRANULOCYTES NFR BLD AUTO: 0 % (ref 0–2)
KETONES UR STRIP-MCNC: NEGATIVE MG/DL
LEUKOCYTE ESTERASE UR QL STRIP: NEGATIVE
LIPASE SERPL-CCNC: 18 U/L (ref 11–82)
LYMPHOCYTES # BLD AUTO: 2.01 THOUSANDS/ÂΜL (ref 0.6–4.47)
LYMPHOCYTES NFR BLD AUTO: 28 % (ref 14–44)
MCH RBC QN AUTO: 27 PG (ref 26.8–34.3)
MCHC RBC AUTO-ENTMCNC: 31.6 G/DL (ref 31.4–37.4)
MCV RBC AUTO: 86 FL (ref 82–98)
MONOCYTES # BLD AUTO: 0.52 THOUSAND/ÂΜL (ref 0.17–1.22)
MONOCYTES NFR BLD AUTO: 7 % (ref 4–12)
NEUTROPHILS # BLD AUTO: 4.58 THOUSANDS/ÂΜL (ref 1.85–7.62)
NEUTS SEG NFR BLD AUTO: 64 % (ref 43–75)
NITRITE UR QL STRIP: NEGATIVE
NRBC BLD AUTO-RTO: 0 /100 WBCS
PH UR STRIP.AUTO: 7 [PH]
PLATELET # BLD AUTO: 333 THOUSANDS/UL (ref 149–390)
PMV BLD AUTO: 9 FL (ref 8.9–12.7)
POTASSIUM SERPL-SCNC: 4.2 MMOL/L (ref 3.5–5.3)
PROT SERPL-MCNC: 7.5 G/DL (ref 6.4–8.4)
PROT UR STRIP-MCNC: NEGATIVE MG/DL
RBC # BLD AUTO: 5.25 MILLION/UL (ref 3.81–5.12)
SL AMB  POCT GLUCOSE, UA: NEGATIVE
SL AMB LEUKOCYTE ESTERASE,UA: NEGATIVE
SL AMB POCT BILIRUBIN,UA: NEGATIVE
SL AMB POCT BLOOD,UA: ABNORMAL
SL AMB POCT CLARITY,UA: CLEAR
SL AMB POCT COLOR,UA: YELLOW
SL AMB POCT KETONES,UA: NEGATIVE
SL AMB POCT NITRITE,UA: NEGATIVE
SL AMB POCT PH,UA: 6
SL AMB POCT SPECIFIC GRAVITY,UA: 1.01
SL AMB POCT URINE HCG: NEGATIVE
SL AMB POCT URINE PROTEIN: NEGATIVE
SL AMB POCT UROBILINOGEN: 0.2
SODIUM SERPL-SCNC: 138 MMOL/L (ref 135–147)
SP GR UR STRIP.AUTO: 1.01
UROBILINOGEN UR QL STRIP.AUTO: 0.2 E.U./DL
WBC # BLD AUTO: 7.26 THOUSAND/UL (ref 4.31–10.16)

## 2024-09-30 PROCEDURE — 85025 COMPLETE CBC W/AUTO DIFF WBC: CPT | Performed by: EMERGENCY MEDICINE

## 2024-09-30 PROCEDURE — 81003 URINALYSIS AUTO W/O SCOPE: CPT | Performed by: EMERGENCY MEDICINE

## 2024-09-30 PROCEDURE — 99284 EMERGENCY DEPT VISIT MOD MDM: CPT

## 2024-09-30 PROCEDURE — 81002 URINALYSIS NONAUTO W/O SCOPE: CPT | Performed by: ORTHOPAEDIC SURGERY

## 2024-09-30 PROCEDURE — 96374 THER/PROPH/DIAG INJ IV PUSH: CPT

## 2024-09-30 PROCEDURE — 74177 CT ABD & PELVIS W/CONTRAST: CPT

## 2024-09-30 PROCEDURE — 99285 EMERGENCY DEPT VISIT HI MDM: CPT | Performed by: EMERGENCY MEDICINE

## 2024-09-30 PROCEDURE — 80053 COMPREHEN METABOLIC PANEL: CPT | Performed by: EMERGENCY MEDICINE

## 2024-09-30 PROCEDURE — 81025 URINE PREGNANCY TEST: CPT | Performed by: ORTHOPAEDIC SURGERY

## 2024-09-30 PROCEDURE — 99213 OFFICE O/P EST LOW 20 MIN: CPT | Performed by: ORTHOPAEDIC SURGERY

## 2024-09-30 PROCEDURE — 36415 COLL VENOUS BLD VENIPUNCTURE: CPT | Performed by: EMERGENCY MEDICINE

## 2024-09-30 PROCEDURE — 83690 ASSAY OF LIPASE: CPT | Performed by: EMERGENCY MEDICINE

## 2024-09-30 PROCEDURE — 84702 CHORIONIC GONADOTROPIN TEST: CPT | Performed by: EMERGENCY MEDICINE

## 2024-09-30 RX ORDER — ONDANSETRON 2 MG/ML
4 INJECTION INTRAMUSCULAR; INTRAVENOUS ONCE
Status: COMPLETED | OUTPATIENT
Start: 2024-09-30 | End: 2024-09-30

## 2024-09-30 RX ADMIN — IOHEXOL 100 ML: 350 INJECTION, SOLUTION INTRAVENOUS at 14:14

## 2024-09-30 RX ADMIN — ONDANSETRON 4 MG: 2 INJECTION INTRAMUSCULAR; INTRAVENOUS at 12:45

## 2024-09-30 NOTE — Clinical Note
Radha Salazar was seen and treated in our emergency department on 9/30/2024.    No restrictions            Diagnosis:     Radha  .    She may return on this date: 10/02/2024         If you have any questions or concerns, please don't hesitate to call.      Yoan Quiles III, DO    ______________________________           _______________          _______________  Hospital Representative                              Date                                Time

## 2024-09-30 NOTE — PROGRESS NOTES
[facility]        NAME: Radha Salazar is a 24 y.o. female  : 2000    MRN: [id number]  DATE: 2024  TIME: 11:38 AM    Assessment and Plan   Right-sided low back pain without sciatica, unspecified chronicity [M54.50]  1. Right-sided low back pain without sciatica, unspecified chronicity  POCT urine dip    POCT urine HCG    Transfer to other facility      2. Generalized abdominal pain  Transfer to other facility        POCT urine positive for trace blood. No leukocytes, nitrites, or other abnormality.   POCT HCG negative.     Discussed with patient that her ongoing symptoms of lower abdominal pain, right back/flank pain, and vomiting do require further workup at the ED. Patient verbalized understanding, felt comfortable driving herself to [facility].     Patient Instructions     Proceed directly to the ED.    If tests are performed, our office will contact you with results only if changes need to made to the care plan discussed with you at the visit. You can review your full results on [facility].    Chief Complaint     Chief Complaint   Patient presents with    Back Pain     Lower back pain and vomiting started 5 days ago          History of Present Illness       24-year-old female presents to the urgent care for evaluation of abdominal pain, right low back pain, vomiting.  Patient states her period ended 4 days ago.  Since the end of her period she has noticed worsening symptoms with 2 episodes of vomiting, with her last last episode this morning.  She states she does not normally have the symptoms with her..  Last week she did have an upper respiratory infection, though the symptoms have subsided.  She complains of the abdominal pain is worse on the left lower side.  She denies any diarrhea but noticed increased bowel movements.  She states the other day she started to bleed again, but has not noticed any blood in her urine.  She denies any significant  past abdominal or gynecological history.  The patient denies any urinary symptoms.        Review of Systems   Review of Systems   Constitutional:  Negative for chills and fever.   HENT:  Negative for ear pain and sore throat.    Eyes:  Negative for pain and visual disturbance.   Respiratory:  Negative for cough and shortness of breath.    Cardiovascular:  Negative for chest pain and palpitations.   Gastrointestinal:  Positive for abdominal pain, nausea and vomiting. Negative for diarrhea (Increased bowel movements).   Genitourinary:  Positive for vaginal bleeding. Negative for dysuria, frequency, hematuria and urgency.   Musculoskeletal:  Positive for back pain. Negative for arthralgias.   Skin:  Negative for color change and rash.   Neurological:  Negative for dizziness, seizures, syncope, light-headedness and headaches.   All other systems reviewed and are negative.        Current Medications       Current Outpatient Medications:     Xulane 150-35 MCG/24HR, apply 1 patch topically every week, Disp: , Rfl:     phenazopyridine (PYRIDIUM) 100 mg tablet, Take 1 tablet (100 mg total) by mouth 3 (three) times a day as needed for bladder spasms (Patient not taking: Reported on 9/30/2024), Disp: 10 tablet, Rfl: 0    predniSONE 20 mg tablet, 1 tab BID x 4 days, 1 tab daily x 3 days (Patient not taking: Reported on 8/14/2024), Disp: 11 tablet, Rfl: 0    Current Allergies     Allergies as of 09/30/2024    (No Known Allergies)            The following portions of the patient's history were reviewed and updated as appropriate: allergies, current medications, past family history, past medical history, past social history, past surgical history and problem list.     Past Medical History:   Diagnosis Date    Anxiety     Depressed     Seizure (HCC)     Seizure disorder (HCC) 11/15/2019       Past Surgical History:   Procedure Laterality Date    ADENOIDECTOMY      TONSILLECTOMY         Family History   Problem Relation Age of Onset  "   Hypertension Mother     No Known Problems Father          Medications have been verified.        Objective   /65   Pulse 76   Temp 98.1 °F (36.7 °C)   Resp 16   Ht 5' 2\" (1.575 m)   Wt 66.6 kg (146 lb 12.8 oz)   SpO2 99%   BMI 26.85 kg/m²        Physical Exam     Physical Exam  Vitals and nursing note reviewed.   Constitutional:       General: She is not in acute distress.     Appearance: Normal appearance. She is not ill-appearing.   HENT:      Head: Normocephalic and atraumatic.      Nose: Nose normal.      Mouth/Throat:      Mouth: Mucous membranes are moist.      Pharynx: Oropharynx is clear.   Eyes:      Extraocular Movements: Extraocular movements intact.      Pupils: Pupils are equal, round, and reactive to light.   Cardiovascular:      Rate and Rhythm: Normal rate and regular rhythm.      Pulses: Normal pulses.      Heart sounds: Normal heart sounds.   Pulmonary:      Effort: Pulmonary effort is normal. No respiratory distress.      Breath sounds: Normal breath sounds.   Abdominal:      Palpations: Abdomen is soft.      Tenderness: There is abdominal tenderness (Generalized, worse left lower quadrant). There is no right CVA tenderness, left CVA tenderness or rebound.   Musculoskeletal:         General: Normal range of motion.      Cervical back: Normal range of motion.   Skin:     General: Skin is warm and dry.      Capillary Refill: Capillary refill takes less than 2 seconds.   Neurological:      General: No focal deficit present.      Mental Status: She is alert and oriented to person, place, and time.   Psychiatric:         Mood and Affect: Mood normal.         Behavior: Behavior normal.                   "

## 2024-09-30 NOTE — ED PROVIDER NOTES
"Final diagnoses:   Abdominal pain   Flank pain   Renal atrophy, right     ED Disposition       ED Disposition   Discharge    Condition   Stable    Date/Time   Mon Sep 30, 2024  3:11 PM    Comment   Radha Salazar discharge to home/self care.                   Assessment & Plan       Medical Decision Making  Amount and/or Complexity of Data Reviewed  Labs: ordered.  Radiology: ordered.    Risk  Prescription drug management.      24-year-old female presents emerged part with generalized nominal cramping, urine pregnancy negative for pregnancy, patient's last menses was 3 days ago, CT shows no evidence of acute appendicitis, colitis, gallbladder appears to be within normal limits no evidence of obstruction, reviewed discharge instructions with the patient including incidental findings of renal scarring, UA negative for any infectious reasons for the patient's pain, no evidence of stranding on the kidney to suggest pyelonephritis, patient stable for discharge follow-up with PCP and nephrology, amatory referral made on the patient's behalf.    Portions of the record may have been created with voice recognition software. Occasional wrong word or \"sound a like\" substitutions may have occurred due to the inherent limitations of voice recognition software. Read the chart carefully and recognize, using context, where substitutions have occurred.     Counseling: I had a detailed discussion with the patient and/or guardian regarding: the historical points, exam findings, and any diagnostic results supporting the discharge diagnosis, lab results, radiology results, discharge instructions reviewed with patient and/or family/caregiver and understanding was verbalized. Instructions given to return to the emergency department if symptoms worsen or persist, or if there are any questions or concerns that arise at home.    ED Course as of 09/30/24 1619   Mon Sep 30, 2024   1242 Patient seen and evaluated, orders placed, referred " here from urgent care for 4 to 5-day history of generalized abdominal cramping, nausea vomiting, finished her menses the 27th of this month.  No relevant past surgical history.    Brief focused differential dx in this patient is as follows: Renal colic versus atypical presentation for appendicitis versus colitis versus gastroenteritis.   1608 At the time of discharge I reviewed the CT results, patient does have right renal atrophy with some cortical scarring, no prior history of chronic kidney infections or pyelonephritis, patient will be referred to nephrology for further follow-up regarding this.  Patient's renal function is normal.  Etiology of this is unclear, patient has had prior CT imaging within our system to compare to.  This may be a new finding or chronic finding.  Alysis was not collected at the time of discharge, if patient will be called with results and appropriate management will will be called in terms of antibiotics if needed.  Patient is stable at discharge.       Medications   ondansetron (ZOFRAN) injection 4 mg (4 mg Intravenous Given 9/30/24 1245)   iohexol (OMNIPAQUE) 350 MG/ML injection (MULTI-DOSE) 100 mL (100 mL Intravenous Given 9/30/24 1414)       ED Risk Strat Scores                                               History of Present Illness       Chief Complaint   Patient presents with    Flank Pain     Right sided flank pain since getting her period last week. Stopped bleeding Friday but began again yesterday.        Past Medical History:   Diagnosis Date    Anxiety     Depressed     Seizure (HCC)     Seizure disorder (HCC) 11/15/2019      Past Surgical History:   Procedure Laterality Date    ADENOIDECTOMY      TONSILLECTOMY        Family History   Problem Relation Age of Onset    Hypertension Mother     No Known Problems Father       Social History     Tobacco Use    Smoking status: Never    Smokeless tobacco: Never   Vaping Use    Vaping status: Never Used   Substance Use Topics     "Alcohol use: Yes     Comment: socially    Drug use: Never      E-Cigarette/Vaping    E-Cigarette Use Never User       E-Cigarette/Vaping Substances    Nicotine No     THC No     CBD No     Flavoring No     Other No     Unknown No       I have reviewed and agree with the history as documented.     HPI      24-year-old female, last menstrual period was on the 27 this month, presents the emergency department a 4 to 5-day history of generalized abdominal cramping with nausea vomiting illness, no hematochezia, no mopped assist, no melena, no fever or chills.  Did report having some sweating episodes in the middle night 2 days ago, no rash, no recent travel outside the geographical area.  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  Vomited today while at school, prior history of kidney stones.  In 2017 she had a \"almost with her appendix\", EMR revealed the patient had a workup in terms of imaging which was subsequently negative.  Patient has no relevant past surgical history.      Remaining 12 point review of systems is unremarkable.  Review of Systems   Constitutional: Negative.    HENT: Negative.     Eyes: Negative.    Respiratory:  Negative for shortness of breath.    Cardiovascular:  Negative for chest pain, palpitations and leg swelling.   Gastrointestinal:  Positive for abdominal pain. Negative for nausea and vomiting.   Endocrine: Negative.    Genitourinary: Negative.    Musculoskeletal: Negative.    Skin: Negative.    Allergic/Immunologic: Negative.    Neurological: Negative.    Hematological: Negative.    Psychiatric/Behavioral: Negative.             Objective       ED Triage Vitals   Temperature Pulse Blood Pressure Respirations SpO2 Patient Position - Orthostatic VS   09/30/24 1155 09/30/24 1155 09/30/24 1156 09/30/24 1155 09/30/24 1155 09/30/24 1156   98.1 °F (36.7 °C) 68 113/63 20 99 % Sitting      Temp Source Heart Rate Source BP Location FiO2 (%) Pain Score    09/30/24 1155 " 09/30/24 1155 09/30/24 1156 -- 09/30/24 1155    Temporal Monitor Left arm  6      Vitals      Date and Time Temp Pulse SpO2 Resp BP Pain Score FACES Pain Rating User   09/30/24 1430 -- 68 98 % 18 119/68 [place] [facility] [place]   09/30/24 1156 -- -- -- -- 113/63 -- -- [facility]   09/30/24 1155 98.1 °F (36.7 °C) 68 99 % 20 -- 6 -- [facility]            Physical Exam  Vitals and nursing note reviewed.   Constitutional:       General: She is not in acute distress.     Appearance: Normal appearance. She is normal weight. She is not ill-appearing, toxic-appearing or diaphoretic.   HENT:      Head: Normocephalic and atraumatic.      Right Ear: External ear normal.      Left Ear: External ear normal.      Nose: Nose normal.      Mouth/Throat:      Mouth: Mucous membranes are moist.      Pharynx: Oropharynx is clear.   Eyes:      Extraocular Movements: Extraocular movements intact.      Conjunctiva/sclera: Conjunctivae normal.      Pupils: Pupils are equal, round, and reactive to light.   Cardiovascular:      Rate and Rhythm: Normal rate and regular rhythm.      Pulses: Normal pulses.      Heart sounds: Normal heart sounds.   Pulmonary:      Effort: Pulmonary effort is normal. No respiratory distress.      Breath sounds: Normal breath sounds. No stridor. No wheezing, rhonchi or rales.   Chest:      Chest wall: No tenderness.   Abdominal:      General: Abdomen is flat. Bowel sounds are normal. There is no distension.      Palpations: There is no mass.      Tenderness: There is abdominal tenderness.       Musculoskeletal:         General: No swelling, tenderness, deformity or signs of injury. Normal range of motion.      Cervical back: Normal range of motion.      Right lower leg: No edema.      Left lower leg: No edema.   Skin:     General: Skin is warm.      Capillary Refill: Capillary refill takes less than 2 seconds.   Neurological:      General: No focal deficit present.      Mental Status: She is alert and oriented to person, place, and time.  Mental status is at baseline.   Psychiatric:         Mood and Affect: Mood normal.         Behavior: Behavior normal.         Thought Content: Thought content normal.         Judgment: Judgment normal.         Results Reviewed       Procedure Component Value Units Date/Time    UA w Reflex to Microscopic w Reflex to Culture [[id number]]  (Normal) Collected: 09/30/24 1604    Lab Status: Final result Specimen: Urine, Clean Catch Updated: 09/30/24 1610     Color, UA Yellow     Clarity, UA Clear     Specific Gravity, UA 1.015     pH, UA 7.0     Leukocytes, UA Negative     Nitrite, UA Negative     Protein, UA Negative mg/dl      Glucose, UA Negative mg/dl      Ketones, UA Negative mg/dl      Urobilinogen, UA 0.2 E.U./dl      Bilirubin, UA Negative     Occult Blood, UA Negative    Quantitative hCG [[id number]]  (Normal) Collected: 09/30/24 1244    Lab Status: Final result Specimen: Blood from Arm, Left Updated: 09/30/24 1326     HCG, Quant <0.6 mIU/mL     Narrative:       Expected Ranges:    HCG results between 5.0 and 25.0 mIU/mL may be indicative of early pregnancy but should be interpreted in light of the total clinical presentation.    HCG can rise to detectable levels in destinee and post menopausal women (0-11.6 mIU/mL).     Approximate               Approximate HCG  Gestation age          Concentration ( mIU/mL)  _____________          ______________________   Weeks                      HCG values  0.2-1                       5-50  1-2                           2-3                         100-5000  3-4                         500-60112  4-5                         1000-11860  5-6                         [id number]  6-8                         [id number]  8-12                        [id number]      Comprehensive metabolic panel [[id number]] Collected: 09/30/24 1244    Lab Status: Final result Specimen: Blood from Arm, Left Updated: 09/30/24 1308     Sodium 138 mmol/L      Potassium 4.2 mmol/L      Chloride 105  mmol/L      CO2 24 mmol/L      ANION GAP 9 mmol/L      BUN 12 mg/dL      Creatinine 0.86 mg/dL      Glucose 89 mg/dL      Calcium 9.6 mg/dL      AST 13 U/L      ALT 9 U/L      Alkaline Phosphatase 61 U/L      Total Protein 7.5 g/dL      Albumin 4.4 g/dL      Total Bilirubin 0.38 mg/dL      eGFR 95 ml/min/1.73sq m     Narrative:      National Kidney Disease Foundation guidelines for Chronic Kidney Disease (CKD):     Stage 1 with normal or high GFR (GFR > 90 mL/min/1.73 square meters)    Stage 2 Mild CKD (GFR = 60-89 mL/min/1.73 square meters)    Stage 3A Moderate CKD (GFR = 45-59 mL/min/1.73 square meters)    Stage 3B Moderate CKD (GFR = 30-44 mL/min/1.73 square meters)    Stage 4 Severe CKD (GFR = 15-29 mL/min/1.73 square meters)    Stage 5 End Stage CKD (GFR <15 mL/min/1.73 square meters)  Note: GFR calculation is accurate only with a steady state creatinine    Lipase [[id number]]  (Normal) Collected: 09/30/24 1244    Lab Status: Final result Specimen: Blood from Arm, Left Updated: 09/30/24 1308     Lipase 18 u/L     CBC and differential [[id number]]  (Abnormal) Collected: 09/30/24 1244    Lab Status: Final result Specimen: Blood from Arm, Left Updated: 09/30/24 1256     WBC 7.26 Thousand/uL      RBC 5.25 Million/uL      Hemoglobin 14.2 g/dL      Hematocrit 44.9 %      MCV 86 fL      MCH 27.0 pg      MCHC 31.6 g/dL      RDW 13.1 %      MPV 9.0 fL      Platelets 333 Thousands/uL      nRBC 0 /100 WBCs      Segmented % 64 %      Immature Grans % 0 %      Lymphocytes % 28 %      Monocytes % 7 %      Eosinophils Relative 1 %      Basophils Relative 0 %      Absolute Neutrophils 4.58 Thousands/µL      Absolute Immature Grans 0.03 Thousand/uL      Absolute Lymphocytes 2.01 Thousands/µL      Absolute Monocytes 0.52 Thousand/µL      Eosinophils Absolute 0.10 Thousand/µL      Basophils Absolute 0.02 Thousands/µL             CT abdomen pelvis with contrast   Final Interpretation by Bhupinder Kohler MD (09/30 3567)      No  acute abnormality in the abdomen or pelvis.      Right renal atrophy and cortical scarring.         Workstation performed: [id number]             Procedures    ED Medication and Procedure Management   Prior to Admission Medications   Prescriptions Last Dose Informant Patient Reported? Taking?   Xulane 150-35 MCG/24HR   Yes No   Sig: apply 1 patch topically every week   phenazopyridine (PYRIDIUM) 100 mg tablet   No No   Sig: Take 1 tablet (100 mg total) by mouth 3 (three) times a day as needed for bladder spasms   Patient not taking: Reported on 2024   predniSONE 20 mg tablet   No No   Si tab BID x 4 days, 1 tab daily x 3 days   Patient not taking: Reported on 2024      Facility-Administered Medications: None     Discharge Medication List as of 2024  3:49 PM        CONTINUE these medications which have NOT CHANGED    Details   phenazopyridine (PYRIDIUM) 100 mg tablet Take 1 tablet (100 mg total) by mouth 3 (three) times a day as needed for bladder spasms, Starting Wed 2024, Normal      predniSONE 20 mg tablet 1 tab BID x 4 days, 1 tab daily x 3 days, Normal      Xulane 150-35 MCG/24HR apply 1 patch topically every week, Historical Med             ED SEPSIS DOCUMENTATION   Time reflects when diagnosis was documented in both MDM as applicable and the Disposition within this note       Time User Action Codes Description Comment    2024  3:22 PM Yoan Quiles [R10.9] Abdominal pain     2024  3:22 PM Yoan Quiles [R10.9] Flank pain     2024  3:25 PM Yoan Quiles [N26.1] Renal atrophy, right                  Yoan Quiles III, DO  24 1615

## 2024-10-25 DIAGNOSIS — N26.1 RENAL ATROPHY: Primary | ICD-10-CM

## 2024-10-28 ENCOUNTER — CONSULT (OUTPATIENT)
Age: 24
End: 2024-10-28
Payer: COMMERCIAL

## 2024-10-28 VITALS
DIASTOLIC BLOOD PRESSURE: 60 MMHG | BODY MASS INDEX: 27.05 KG/M2 | TEMPERATURE: 97.4 F | SYSTOLIC BLOOD PRESSURE: 100 MMHG | OXYGEN SATURATION: 98 % | HEIGHT: 62 IN | WEIGHT: 147 LBS | HEART RATE: 90 BPM

## 2024-10-28 DIAGNOSIS — N26.1 RENAL ATROPHY, RIGHT: ICD-10-CM

## 2024-10-28 PROCEDURE — 99203 OFFICE O/P NEW LOW 30 MIN: CPT | Performed by: INTERNAL MEDICINE

## 2024-10-28 NOTE — ASSESSMENT & PLAN NOTE
Unclear etiology at this time.  Patient has a family history of a grandmother who also has 1 atrophied kidney.  Patient's blood pressures are appropriate, but will need to rule out vascular etiology of reduction in the size of the kidney.  No obvious finding noted on CT scan with contrast.    Another potential etiology is the patient has a history of holding urine for excessive amounts of time.  During the patient's imaging, she had enlarged bladder and admits having reduced ability to hold urine, despite her young age, when trying to get to the restroom.  Patient also has a history of urinary tract infections.  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  She was asked to try to have better bladder hygiene with respect to not allowing it to overfill and have regular emptying in order to hopefully shrink the bladder size, if this is a potential issue, to a more physiologically appropriate size.  Patient may benefit from further evaluation with our urology colleagues depending on she progresses from a clinical standpoint.    Plan at this time is to proceed with renal artery Doppler to rule out hemodynamically significant stenosis of the artery, plan for bladder hygiene as noted above, and have follow-up blood work as well as potential further imaging depending how she progresses from a clinical standpoint.

## 2024-10-28 NOTE — PROGRESS NOTES
[facility] [place]  Nestor Mcwilliams DO    Name: Radha Salazar  YOB: 2000      Assessment/Plan:    Renal atrophy, right  Unclear etiology at this time.  Patient has a family history of a grandmother who also has 1 atrophied kidney.  Patient's blood pressures are appropriate, but will need to rule out vascular etiology of reduction in the size of the kidney.  No obvious finding noted on CT scan with contrast.    Another potential etiology is the patient has a history of holding urine for excessive amounts of time.  During the patient's imaging, she had enlarged bladder and admits having reduced ability to hold urine, despite her young age, when trying to get to the restroom.  Patient also has a history of urinary tract infections.  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  She was asked to try to have better bladder hygiene with respect to not allowing it to overfill and have regular emptying in order to hopefully shrink the bladder size, if this is a potential issue, to a more physiologically appropriate size.  Patient may benefit from further evaluation with our urology colleagues depending on she progresses from a clinical standpoint.    Plan at this time is to proceed with renal artery Doppler to rule out hemodynamically significant stenosis of the artery, plan for bladder hygiene as noted above, and have follow-up blood work as well as potential further imaging depending how she progresses from a clinical standpoint.         Problem List Items Addressed This Visit          Genitourinary    Renal atrophy, right     Unclear etiology at this time.  Patient has a family history of a grandmother who also has 1 atrophied kidney.  Patient's blood pressures are appropriate, but will need to rule out vascular etiology of reduction in the size of the kidney.  No obvious finding noted on CT scan with contrast.    Another  potential etiology is the patient has a history of holding urine for excessive amounts of time.  During the patient's imaging, she had enlarged bladder and admits having reduced ability to hold urine, despite her young age, when trying to get to the restroom.  Patient also has a history of urinary tract infections.  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  She was asked to try to have better bladder hygiene with respect to not allowing it to overfill and have regular emptying in order to hopefully shrink the bladder size, if this is a potential issue, to a more physiologically appropriate size.  Patient may benefit from further evaluation with our urology colleagues depending on she progresses from a clinical standpoint.    Plan at this time is to proceed with renal artery Doppler to rule out hemodynamically significant stenosis of the artery, plan for bladder hygiene as noted above, and have follow-up blood work as well as potential further imaging depending how she progresses from a clinical standpoint.         Relevant Orders    VAS renal artery complete       Thank you for allowing us to participate in the care of your patient.  Please refer above for details.  We reviewed CT scan findings, we will proceed with further workup with a renal artery Doppler.  Additional testing may be indicated.  Thankfully patient's urinalysis shows no active sediment.  Kidney function is appropriate.  Patient was also provided with reassurance regarding current kidney function and perspective function in the future depending on various scenarios on how she progresses clinically.    We will reassess kidney function in 4-6 months, plan to see her in the summer of 2025, with additional labs or imaging at that time or as clinically indicated.    Subjective:      Patient ID: Radha Salazar is a 24 y.o. female.    Patient presents for initial evaluation.    We reviewed labs in detail,  most recent creatinine noted to be 0.86 mg/dL, estimate GFR 95 mL/min.    There were no significant electrolyte abnormalities noted.  Patient is taking all medications as prescribed with no specific side effects and denies use of nonsteroid anti-inflammatory medications.    We reviewed the patient's CT scan with contrast.  Right kidney noted to be reduced in size compared to the left.  Contrast noted in the right kidney with no specific deficits.  In addition, the patient noted to have an enlarged bladder.  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  She also has episodes of urinary tract infections, however no kidney infections.  Patient denies overt trauma to the right side of the abdomen or chest in the past.          The following portions of the patient's history were reviewed and updated as appropriate: allergies, current medications, past family history, past medical history, past social history, past surgical history and problem list.    Review of Systems   All other systems reviewed and are negative.        Social History     Socioeconomic History    Marital status: Single     Spouse name: None    Number of children: None    Years of education: None    Highest education level: None   Occupational History    None   Tobacco Use    Smoking status: Never    Smokeless tobacco: Never   Vaping Use    Vaping status: Never Used   Substance and Sexual Activity    Alcohol use: Yes     Comment: socially    Drug use: Never    Sexual activity: Yes     Partners: Male     Birth control/protection: OCP, Condom Male   Other Topics Concern    None   Social History Narrative    None     Social Determinants of Health     Financial Resource Strain: Not on file   Food Insecurity: Not on file   Transportation Needs: Not on file   Physical Activity: Not on file   Stress: Not on file   Social Connections: Not on file   Intimate Partner Violence: Not on file   Housing Stability: Not on  "file     Past Medical History:   Diagnosis Date    Anxiety     Depressed     Seizure (HCC)     Seizure disorder (HCC) 11/15/2019     Past Surgical History:   Procedure Laterality Date    ADENOIDECTOMY      TONSILLECTOMY         Current Outpatient Medications:     Xulane 150-35 MCG/24HR, apply 1 patch topically every week, Disp: , Rfl:     Lab Results   Component Value Date    SODIUM 138 09/30/2024    K 4.2 09/30/2024     09/30/2024    CO2 24 09/30/2024    AGAP 9 09/30/2024    BUN 12 09/30/2024    CREATININE 0.86 09/30/2024    GLUC 89 09/30/2024    GLUF 83 03/06/2024    CALCIUM 9.6 09/30/2024    AST 13 09/30/2024    ALT 9 09/30/2024    ALKPHOS 61 09/30/2024    TP 7.5 09/30/2024    TBILI 0.38 09/30/2024    EGFR 95 09/30/2024     Lab Results   Component Value Date    WBC 7.26 09/30/2024    HGB 14.2 09/30/2024    HCT 44.9 09/30/2024    MCV 86 09/30/2024     09/30/2024     No results found for: \"CHOLESTEROL\"  No results found for: \"HDL\"  No results found for: \"LDLCALC\"  No results found for: \"TRIG\"  No results found for: \"CHOLHDL\"  Lab Results   Component Value Date    EXU6OMMNYCXA 2.008 03/06/2024    TSH 1.63 11/08/2019     Lab Results   Component Value Date    CALCIUM 9.6 09/30/2024     No results found for: \"SPEP\", \"UPEP\"  No results found for: \"MICROALBUR\", \"[id number]\"        Objective:      /60 (BP Location: Left arm, Patient Position: Sitting, Cuff Size: Standard)   Pulse 90   Temp (!) 97.4 °F (36.3 °C)   Ht 5' 2\" (1.575 m)   Wt 66.7 kg (147 lb)   SpO2 98%   BMI 26.89 kg/m²          Physical Exam  Vitals reviewed.   Constitutional:       General: She is not in acute distress.     Appearance: Normal appearance.   HENT:      Head: Normocephalic and atraumatic.      Right Ear: External ear normal.      Left Ear: External ear normal.   Eyes:      Conjunctiva/sclera: Conjunctivae normal.   Cardiovascular:      Rate and Rhythm: Normal rate and regular rhythm.      Heart sounds: Normal heart " sounds.   Pulmonary:      Effort: No respiratory distress.      Breath sounds: No wheezing.   Abdominal:      Palpations: Abdomen is soft.   Skin:     General: Skin is warm and dry.   Neurological:      General: No focal deficit present.      Mental Status: She is alert and oriented to person, place, and time.   Psychiatric:         Mood and Affect: Mood normal.         Behavior: Behavior normal.

## 2024-11-06 ENCOUNTER — HOSPITAL ENCOUNTER (OUTPATIENT)
Dept: NON INVASIVE DIAGNOSTICS | Facility: HOSPITAL | Age: 24
Discharge: HOME | End: 2024-11-06
Attending: INTERNAL MEDICINE
Payer: COMMERCIAL

## 2024-11-06 DIAGNOSIS — N26.1 RENAL ATROPHY, RIGHT: ICD-10-CM

## 2024-11-06 PROCEDURE — 93975 VASCULAR STUDY: CPT

## 2024-11-06 PROCEDURE — 93975 VASCULAR STUDY: CPT | Performed by: SURGERY

## 2024-11-07 DIAGNOSIS — N26.1 RENAL ATROPHY, RIGHT: Primary | ICD-10-CM

## 2024-11-13 ENCOUNTER — RESULTS FOLLOW-UP (OUTPATIENT)
Age: 24
End: 2024-11-13

## 2025-04-11 ENCOUNTER — APPOINTMENT (OUTPATIENT)
Dept: LAB | Facility: CLINIC | Age: 25
End: 2025-04-11
Payer: COMMERCIAL

## 2025-04-11 ENCOUNTER — TELEMEDICINE (OUTPATIENT)
Dept: OTHER | Facility: HOSPITAL | Age: 25
End: 2025-04-11
Payer: COMMERCIAL

## 2025-04-11 DIAGNOSIS — R10.9 FLANK PAIN: ICD-10-CM

## 2025-04-11 DIAGNOSIS — B34.9 VIRAL SYNDROME: ICD-10-CM

## 2025-04-11 DIAGNOSIS — R10.9 FLANK PAIN: Primary | ICD-10-CM

## 2025-04-11 PROCEDURE — 81001 URINALYSIS AUTO W/SCOPE: CPT

## 2025-04-11 PROCEDURE — 99213 OFFICE O/P EST LOW 20 MIN: CPT | Performed by: PHYSICIAN ASSISTANT

## 2025-04-11 NOTE — PATIENT INSTRUCTIONS
"[facility] Phone number is [phone] if you need assistance or have further questions  Notes can be found in \"Letters\" section of [employer] chad    You can go to any outpatient [facility]'s Lab to complete the testing that was ordered  Just provide your name and date of birth at registration and they will be able to pull up the orders.  You can search for a lab using [employer] \"Find Care\" and filter by \"Labs\" or click the link below:  [email or web address]    Call [phone], option 2 to request a mobile lab visit to your home    The results will go directly to your [employer] chad under \"Test Results\"    You likely have a virus such as the flu, covid or a cold. Please schedule a follow-up with your PCP within the next 2 days for recheck. Go to the ER for new worsening or concerning symptoms including but not limited to, dehydration, shortness of breath or chest pain    You can have fever for 3-5 days with a viral illness. Need re-evaluation for fevers >5 days. Additional symptoms that develop (congestion, cough, nausea, diarrhea) can last 7-10 days.      Stay out of work/school until fever free for 24 hours without use of tylenol or motrin     Make sure you have a thermometer and if you feel chills or sweats check it and write it down.  Take Tylenol (acetaminophen) and Motrin (ibuprofen) for fevers, body aches, and headaches. Alternate Motrin and Tylenol every 3 hours for more consistent relief.     Drink plenty of fluids to stay well hydrated- at least 2 L per day for adults  Water, hot water with lemon or honey, warm tea.   Can drink Pedialyte, Gatorade/Powerade zero, but should mix with water.      For diarrhea, vomiting and abdominal pain   Milk or juice can make diarrhea worse.  follow \"BRAT\" diet Bananas, Rice, Applesauce, Toast (also plain pasta or crackers)  Small frequent meals   Allow diarrhea to run its course. Most cases will resolved in 3-5 days     Use over-the-counter " saline nasal spray/allergy medication for congestion, runny nose, and postnasal drip  Mucinex (guaifenesin) helps to thin and loosen mucous.   Claritin, Zyrtec, Xyzal, or Allegra are non-drowsy antihistamines- helps dry out mucous (will make mucous darker)  Delsym or robitussin (dextromethorphan) is a cough suppressant.  Oral decongestants- pseudoephedrine behind the counter pill helps with nasal and sinus congestion  Nasal Decongestants- phenylephrine or fluticasone nasal spray (oxymetazoline up to 3 days)  Vicks to the front/back of the chest bottom of the feet with socks     For sore throat relief  Warm salt water gargles, warm tea with honey as needed  Cool mist humidifier at bedside- helps keep airway moist  Chloraseptic spray or throat lozenges with benzocaine (cepacol max strength).

## 2025-04-11 NOTE — PROGRESS NOTES
Virtual Regular Visit  Name: Radha Salazar      : 2000      MRN: [id number]  Encounter Provider: Shannon D Severino, PA-C  Encounter Date: 2025   Encounter department: VIRTUAL CARE       Verification of patient location:  Patient is located at Home in the following state in which I hold an active license [place] :  Assessment & Plan  Flank pain    Orders:    UA w Reflex to Microscopic w Reflex to Culture; Future    Viral syndrome             Encounter provider Shannon D Severino, PA-C    The patient was identified by name and date of birth. Radha Salazar was informed that this is a telemedicine visit and that the visit is being conducted through the Epic Embedded platform. She agrees to proceed..  My office door was closed. No one else was in the room. She acknowledged consent and understanding of privacy and security of the video platform. The patient has agreed to participate and understands they can discontinue the visit at any time.    Patient is aware this is a billable service.     History obtained from: patient  History of Present Illness     Pt reports feeling unwell recently. She is also menstruating. She is a teacher. Since Monday has drenching night sweats, hot flashes, chills, left mid abdominal pain, HA, diarrhea. Endorses sore throat. No fever, dysuria, changes vaginal discharge, concern for STI, V. Home testing not done. No hx CA. Reports hx kidney atrophy      Review of Systems   Constitutional:  Positive for chills and diaphoresis. Negative for fever.   HENT:  Positive for sore throat. Negative for nosebleeds.    Eyes:  Negative for redness.   Respiratory:  Negative for shortness of breath.    Cardiovascular:  Negative for chest pain.   Gastrointestinal:  Positive for abdominal pain and diarrhea. Negative for blood in stool.   Genitourinary:  Positive for flank pain and vaginal bleeding. Negative for dysuria, hematuria and vaginal discharge.   Musculoskeletal:  Negative for  gait problem.   Skin:  Negative for rash.   Neurological:  Positive for headaches. Negative for seizures.   Psychiatric/Behavioral:  Negative for behavioral problems.        Objective   There were no vitals taken for this visit.    Physical Exam  Cardiovascular:      Rate and Rhythm: Normal rate.   Pulmonary:      Breath sounds: No wheezing.   Neurological:      Mental Status: She is alert.      Cranial Nerves: No dysarthria.   Psychiatric:         Mood and Affect: Mood normal.         Visit Time  Total Visit Duration: 8 minutes not including the time spent for establishing the audio/video connection.

## 2025-04-12 LAB
BACTERIA UR QL AUTO: NORMAL /HPF
BILIRUB UR QL STRIP: NEGATIVE
CLARITY UR: CLEAR
COLOR UR: ABNORMAL
GLUCOSE UR STRIP-MCNC: NEGATIVE MG/DL
HGB UR QL STRIP.AUTO: ABNORMAL
KETONES UR STRIP-MCNC: NEGATIVE MG/DL
LEUKOCYTE ESTERASE UR QL STRIP: NEGATIVE
NITRITE UR QL STRIP: NEGATIVE
NON-SQ EPI CELLS URNS QL MICRO: NORMAL /HPF
PH UR STRIP.AUTO: 6.5 [PH]
PROT UR STRIP-MCNC: NEGATIVE MG/DL
RBC #/AREA URNS AUTO: NORMAL /HPF
SP GR UR STRIP.AUTO: 1.02 (ref 1–1.03)
UROBILINOGEN UR STRIP-ACNC: <2 MG/DL
WBC #/AREA URNS AUTO: NORMAL /HPF

## 2025-04-16 ENCOUNTER — OFFICE VISIT (OUTPATIENT)
Dept: URGENT CARE | Facility: CLINIC | Age: 25
End: 2025-04-16
Payer: COMMERCIAL

## 2025-04-16 VITALS
SYSTOLIC BLOOD PRESSURE: 121 MMHG | DIASTOLIC BLOOD PRESSURE: 55 MMHG | BODY MASS INDEX: 28.34 KG/M2 | WEIGHT: 154 LBS | HEART RATE: 86 BPM | OXYGEN SATURATION: 99 % | RESPIRATION RATE: 18 BRPM | HEIGHT: 62 IN | TEMPERATURE: 98.3 F

## 2025-04-16 DIAGNOSIS — J02.9 SORE THROAT: ICD-10-CM

## 2025-04-16 DIAGNOSIS — J01.00 ACUTE NON-RECURRENT MAXILLARY SINUSITIS: Primary | ICD-10-CM

## 2025-04-16 LAB — S PYO AG THROAT QL: NEGATIVE

## 2025-04-16 PROCEDURE — 99213 OFFICE O/P EST LOW 20 MIN: CPT | Performed by: NURSE PRACTITIONER

## 2025-04-16 PROCEDURE — 87880 STREP A ASSAY W/OPTIC: CPT | Performed by: NURSE PRACTITIONER

## 2025-04-16 PROCEDURE — 87070 CULTURE OTHR SPECIMN AEROBIC: CPT | Performed by: NURSE PRACTITIONER

## 2025-04-16 RX ORDER — AMOXICILLIN 875 MG/1
875 TABLET, COATED ORAL 2 TIMES DAILY
Qty: 20 TABLET | Refills: 0 | Status: SHIPPED | OUTPATIENT
Start: 2025-04-16 | End: 2025-04-26

## 2025-04-16 NOTE — PROGRESS NOTES
[facility]        NAME: Radha Salazar is a 24 y.o. female  : 2000    MRN: [id number]  DATE: 2025  TIME: 11:29 AM      Assessment and Plan     Acute non-recurrent maxillary sinusitis [J01.00]  1. Acute non-recurrent maxillary sinusitis  amoxicillin (AMOXIL) 875 mg tablet      2. Sore throat  POCT rapid ANTIGEN strepA    amoxicillin (AMOXIL) 875 mg tablet    Throat culture            Patient Instructions   There are no Patient Instructions on file for this visit.    Follow up with PCP in 3-5 days.  Proceed to  ER if symptoms worsen.    Chief Complaint     Chief Complaint   Patient presents with    Sore Throat     Sore throat, diarrhea, and a headache that started last Monday         History of Present Illness     Patient had initial onset of symptoms of illness 9 days ago Monday.  She had acute watery diarrhea which has improved.  She is having mostly formed/slightly soft stools but still with increased frequency.  She has had nasal congestion x 9 days and states that the pressure over her cheeks/bilateral maxillary sinuses is persisting and slowly worsening.  She is had headache and sore throat throughout this.  She does express concern about possible strep.  No specific sick contacts but she works as a .  She does have a history of mild allergies and has taken during this illness without improvement or relief of the congestion symptoms.        Review of Systems     Review of Systems   HENT:  Positive for congestion, sinus pressure, sinus pain and sore throat.    Gastrointestinal:  Positive for diarrhea.   All other systems reviewed and are negative.        Current Medications       Current Outpatient Medications:     amoxicillin (AMOXIL) 875 mg tablet, Take 1 tablet (875 mg total) by mouth 2 (two) times a day for 10 days, Disp: 20 tablet, Rfl: 0    Xulane 150-35 MCG/24HR, apply 1 patch topically every week, Disp: , Rfl:     Current Allergies  "    Allergies as of 04/16/2025    (No Known Allergies)              The following portions of the patient's history were reviewed and updated as appropriate: allergies, current medications, past family history, past medical history, past social history, past surgical history and problem list.     Past Medical History:   Diagnosis Date    Anxiety     Depressed     Seizure (HCC)     Seizure disorder (HCC) 11/15/2019       Past Surgical History:   Procedure Laterality Date    ADENOIDECTOMY      TONSILLECTOMY         Family History   Problem Relation Age of Onset    Hypertension Mother     No Known Problems Father          Medications have been verified.        Objective     /55   Pulse 86   Temp 98.3 °F (36.8 °C)   Resp 18   Ht 5' 2\" (1.575 m)   Wt 69.9 kg (154 lb)   LMP 03/31/2025   SpO2 99%   BMI 28.17 kg/m²   Patient's last menstrual period was 03/31/2025.         Physical Exam     Physical Exam  Vitals and nursing note reviewed.   Constitutional:       General: She is not in acute distress.     Appearance: Normal appearance. She is well-developed and well-groomed. She is ill-appearing (Mild). She is not toxic-appearing or diaphoretic.   HENT:      Head: Normocephalic and atraumatic.      Right Ear: Ear canal and external ear normal. No drainage or swelling. A middle ear effusion is present. Tympanic membrane is not erythematous.      Left Ear: Ear canal and external ear normal. No drainage or swelling. A middle ear effusion is present. Tympanic membrane is not erythematous.      Nose: Mucosal edema and congestion present.      Right Sinus: Maxillary sinus tenderness present.      Left Sinus: Maxillary sinus tenderness present.      Mouth/Throat:      Pharynx: Oropharynx is clear. Uvula midline. Posterior oropharyngeal erythema (Mild) and postnasal drip present. No oropharyngeal exudate.      Tonsils: 0 on the right. 0 on the left.   Eyes:      Pupils: Pupils are equal, round, and reactive to light. "   Cardiovascular:      Rate and Rhythm: Normal rate and regular rhythm. No extrasystoles are present.     Heart sounds: Normal heart sounds, S1 normal and S2 normal.   Pulmonary:      Effort: Pulmonary effort is normal. No tachypnea, bradypnea, accessory muscle usage, prolonged expiration, respiratory distress or retractions.      Breath sounds: Normal breath sounds and air entry. No stridor or decreased air movement. No decreased breath sounds, wheezing, rhonchi or rales.   Abdominal:      General: Bowel sounds are normal. There is no distension.      Palpations: Abdomen is soft.      Tenderness: There is no abdominal tenderness.   Musculoskeletal:         General: Normal range of motion.      Cervical back: Normal range of motion and neck supple.   Lymphadenopathy:      Cervical: Cervical adenopathy present.   Skin:     General: Skin is warm and dry.      Capillary Refill: Capillary refill takes less than 2 seconds.   Neurological:      General: No focal deficit present.      Mental Status: She is alert and oriented to person, place, and time.   Psychiatric:         Mood and Affect: Mood normal.         Behavior: Behavior normal. Behavior is cooperative.         Thought Content: Thought content normal.         Judgment: Judgment normal.

## 2025-04-16 NOTE — LETTER
April 16, 2025     Patient: Radha Salazar   YOB: 2000   Date of Visit: 4/16/2025       To Whom It May Concern:    It is my medical opinion that Radha Salazar may return to work on 4/17 or 4/22 depending on symptoms.  Please excuse from work 4/16 and possibly 4/17 .    If you have any questions or concerns, please don't hesitate to call.         Sincerely,        BOB Bunch    CC: No Recipients

## 2025-04-18 LAB — BACTERIA THROAT CULT: NORMAL

## 2025-07-08 ENCOUNTER — APPOINTMENT (OUTPATIENT)
Dept: LAB | Facility: CLINIC | Age: 25
End: 2025-07-08
Payer: COMMERCIAL

## 2025-07-08 ENCOUNTER — TELEMEDICINE (OUTPATIENT)
Dept: OTHER | Facility: HOSPITAL | Age: 25
End: 2025-07-08
Payer: COMMERCIAL

## 2025-07-08 DIAGNOSIS — N26.1 RENAL ATROPHY, RIGHT: ICD-10-CM

## 2025-07-08 DIAGNOSIS — R30.0 DYSURIA: Primary | ICD-10-CM

## 2025-07-08 DIAGNOSIS — R30.0 DYSURIA: ICD-10-CM

## 2025-07-08 PROCEDURE — 82043 UR ALBUMIN QUANTITATIVE: CPT

## 2025-07-08 PROCEDURE — 81003 URINALYSIS AUTO W/O SCOPE: CPT

## 2025-07-08 PROCEDURE — 82570 ASSAY OF URINE CREATININE: CPT

## 2025-07-08 PROCEDURE — 99213 OFFICE O/P EST LOW 20 MIN: CPT | Performed by: PHYSICIAN ASSISTANT

## 2025-07-08 RX ORDER — PHENAZOPYRIDINE HYDROCHLORIDE 200 MG/1
200 TABLET, FILM COATED ORAL
Qty: 6 TABLET | Refills: 0 | Status: SHIPPED | OUTPATIENT
Start: 2025-07-08 | End: 2025-07-10

## 2025-07-08 RX ORDER — SULFAMETHOXAZOLE AND TRIMETHOPRIM 800; 160 MG/1; MG/1
1 TABLET ORAL 2 TIMES DAILY
Qty: 14 TABLET | Refills: 0 | Status: SHIPPED | OUTPATIENT
Start: 2025-07-08 | End: 2025-07-15

## 2025-07-08 NOTE — PROGRESS NOTES
Virtual Regular Visit  Name: Radha Salazar      : 2000      MRN: [id number]  Encounter Provider: Your Video Visit Provider  Encounter Date: 2025   Encounter department: VIRTUAL CARE       Verification of patient location:  Patient is located at Other in the following state in which I [place] an active license [place] :  Assessment & Plan  Dysuria    Orders:    UA w Reflex to Microscopic w Reflex to Culture; Future    sulfamethoxazole-trimethoprim (BACTRIM DS) 800-160 mg per tablet; Take 1 tablet by mouth in the morning and 1 tablet before bedtime. Do all this for 7 days.    phenazopyridine (PYRIDIUM) 200 mg tablet; Take 1 tablet (200 mg total) by mouth 3 (three) times a day with meals for 2 days        Encounter provider Your Video Visit Provider    The patient was identified by name and date of birth. Radha Salazar was informed that this is a telemedicine visit and that the visit is being conducted through the Epic Embedded platform. She agrees to proceed..  My office door was closed. No one else was in the room. She acknowledged consent and understanding of privacy and security of the video platform. The patient has agreed to participate and understands they can discontinue the visit at any time.    Patient is aware this is a billable service.     History obtained from: patient  History of Present Illness     Pt with hx atrophy of R kidney reports hx of UTIs in the past. Was told she should see a kidney specialist- appt . Has had frequency for a few days. Today developed dysuria, L back pain. Has been drinking a lot of water. Noticed thicker white vaginal discharge with some itching without odor.  No hematuria, fevers, concern for STI or pregnancy.       Review of Systems   Constitutional:  Negative for fever.   HENT:  Negative for nosebleeds.    Eyes:  Negative for redness.   Respiratory:  Negative for shortness of breath.    Cardiovascular:  Negative for chest pain.   Gastrointestinal:   Negative for blood in stool.   Genitourinary:  Positive for dysuria, flank pain, frequency and vaginal discharge. Negative for hematuria.   Musculoskeletal:  Negative for gait problem.   Skin:  Negative for rash.   Neurological:  Negative for seizures.   Psychiatric/Behavioral:  Negative for behavioral problems.        Objective   There were no vitals taken for this visit.    Physical Exam  Constitutional:       General: She is not in acute distress.     Appearance: Normal appearance. She is not toxic-appearing.   HENT:      Head: Normocephalic and atraumatic.      Nose: No rhinorrhea.      Mouth/Throat:      Mouth: Mucous membranes are moist.     Eyes:      Conjunctiva/sclera: Conjunctivae normal.     Pulmonary:      Effort: Pulmonary effort is normal. No respiratory distress.      Breath sounds: No wheezing (no gross audible wheeze through computer).     Musculoskeletal:      Cervical back: Normal range of motion.     Skin:     Findings: No rash (on face or neck).     Neurological:      Mental Status: She is alert.      Cranial Nerves: No dysarthria or facial asymmetry.     Psychiatric:         Mood and Affect: Mood normal.         Behavior: Behavior normal.         Visit Time  Total Visit Duration: 12 minutes not including the time spent for establishing the audio/video connection.

## 2025-07-08 NOTE — PATIENT INSTRUCTIONS
"Thank you for choosing to trust [facility] with your care today. Virtual visits are very convenient, but do have some limitations. Schedule a follow-up appointment with your primary care physician for recheck in person in 2-3 days-especially if symptoms aren't improving. If you cannot see your PCP, you can schedule a follow up appointment at a [facility]. Go to the emergency department if you develop any new or worsening symptoms including back pain, fever, or anything else that is concerning.    Notes for work/school can be found in \"Letters\" section of [employer] chad  Any referrals placed can be found under \"Upcoming Tests & Procedures\"  [facility] Phone number is [phone] if you need assistance or have further questions    [phone])  Schedule or Reschedule Outpatient Testing - Option 2  Billing - Option 3  General Info - Option 4  [employer] Help - Option 5  Comprehensive Spine Program - Option 6    "

## 2025-07-09 LAB
BILIRUB UR QL STRIP: NEGATIVE
CLARITY UR: CLEAR
COLOR UR: YELLOW
CREAT UR-MCNC: 118.7 MG/DL
GLUCOSE UR STRIP-MCNC: NEGATIVE MG/DL
HGB UR QL STRIP.AUTO: NEGATIVE
KETONES UR STRIP-MCNC: NEGATIVE MG/DL
LEUKOCYTE ESTERASE UR QL STRIP: NEGATIVE
MICROALBUMIN UR-MCNC: <7 MG/L
NITRITE UR QL STRIP: NEGATIVE
PH UR STRIP.AUTO: 6 [PH]
PROT UR STRIP-MCNC: NEGATIVE MG/DL
SP GR UR STRIP.AUTO: 1.02 (ref 1–1.03)
UROBILINOGEN UR STRIP-ACNC: <2 MG/DL

## 2025-07-11 ENCOUNTER — HOSPITAL ENCOUNTER (OUTPATIENT)
Dept: ULTRASOUND IMAGING | Facility: HOSPITAL | Age: 25
End: 2025-07-11
Payer: COMMERCIAL

## 2025-07-11 DIAGNOSIS — N26.1 RENAL ATROPHY, RIGHT: ICD-10-CM

## 2025-07-11 PROCEDURE — 76775 US EXAM ABDO BACK WALL LIM: CPT

## 2025-08-01 ENCOUNTER — OFFICE VISIT (OUTPATIENT)
Dept: URGENT CARE | Facility: CLINIC | Age: 25
End: 2025-08-01
Payer: COMMERCIAL

## 2025-08-01 VITALS
RESPIRATION RATE: 16 BRPM | SYSTOLIC BLOOD PRESSURE: 117 MMHG | BODY MASS INDEX: 28.17 KG/M2 | OXYGEN SATURATION: 98 % | WEIGHT: 154 LBS | HEART RATE: 87 BPM | DIASTOLIC BLOOD PRESSURE: 73 MMHG | TEMPERATURE: 99.2 F

## 2025-08-01 DIAGNOSIS — N30.00 ACUTE CYSTITIS WITHOUT HEMATURIA: Primary | ICD-10-CM

## 2025-08-01 DIAGNOSIS — R39.9 UTI SYMPTOMS: ICD-10-CM

## 2025-08-01 DIAGNOSIS — R10.9 ACUTE LEFT FLANK PAIN: ICD-10-CM

## 2025-08-01 LAB
SL AMB  POCT GLUCOSE, UA: NEGATIVE
SL AMB LEUKOCYTE ESTERASE,UA: ABNORMAL
SL AMB POCT BILIRUBIN,UA: NEGATIVE
SL AMB POCT BLOOD,UA: ABNORMAL
SL AMB POCT CLARITY,UA: ABNORMAL
SL AMB POCT COLOR,UA: YELLOW
SL AMB POCT KETONES,UA: NEGATIVE
SL AMB POCT NITRITE,UA: NEGATIVE
SL AMB POCT PH,UA: 6
SL AMB POCT SPECIFIC GRAVITY,UA: 1
SL AMB POCT URINE HCG: NEGATIVE
SL AMB POCT URINE PROTEIN: NEGATIVE
SL AMB POCT UROBILINOGEN: 0.2

## 2025-08-01 PROCEDURE — 81002 URINALYSIS NONAUTO W/O SCOPE: CPT | Performed by: NURSE PRACTITIONER

## 2025-08-01 PROCEDURE — 87077 CULTURE AEROBIC IDENTIFY: CPT | Performed by: NURSE PRACTITIONER

## 2025-08-01 PROCEDURE — 87086 URINE CULTURE/COLONY COUNT: CPT | Performed by: NURSE PRACTITIONER

## 2025-08-01 PROCEDURE — 99214 OFFICE O/P EST MOD 30 MIN: CPT | Performed by: NURSE PRACTITIONER

## 2025-08-01 PROCEDURE — 81025 URINE PREGNANCY TEST: CPT | Performed by: NURSE PRACTITIONER

## 2025-08-01 RX ORDER — CEPHALEXIN 500 MG/1
500 CAPSULE ORAL EVERY 12 HOURS SCHEDULED
Qty: 14 CAPSULE | Refills: 0 | Status: SHIPPED | OUTPATIENT
Start: 2025-08-01 | End: 2025-08-08

## 2025-08-04 LAB — BACTERIA UR CULT: ABNORMAL

## 2025-08-06 ENCOUNTER — TELEPHONE (OUTPATIENT)
Age: 25
End: 2025-08-06

## 2025-08-11 ENCOUNTER — OFFICE VISIT (OUTPATIENT)
Age: 25
End: 2025-08-11
Payer: COMMERCIAL

## 2025-08-11 ENCOUNTER — APPOINTMENT (OUTPATIENT)
Dept: LAB | Facility: CLINIC | Age: 25
End: 2025-08-11
Payer: COMMERCIAL

## 2025-08-15 ENCOUNTER — NURSE TRIAGE (OUTPATIENT)
Age: 25
End: 2025-08-15

## 2025-08-15 ENCOUNTER — OFFICE VISIT (OUTPATIENT)
Dept: URGENT CARE | Facility: CLINIC | Age: 25
End: 2025-08-15
Payer: COMMERCIAL